# Patient Record
Sex: MALE | Race: WHITE | NOT HISPANIC OR LATINO | Employment: OTHER | URBAN - METROPOLITAN AREA
[De-identification: names, ages, dates, MRNs, and addresses within clinical notes are randomized per-mention and may not be internally consistent; named-entity substitution may affect disease eponyms.]

---

## 2017-01-03 ENCOUNTER — TELEPHONE (OUTPATIENT)
Dept: VASCULAR SURGERY | Facility: CLINIC | Age: 67
End: 2017-01-03

## 2017-01-03 ENCOUNTER — ANESTHESIA EVENT (OUTPATIENT)
Dept: SURGERY | Facility: HOSPITAL | Age: 67
DRG: 253 | End: 2017-01-03
Payer: MEDICARE

## 2017-01-04 ENCOUNTER — ANESTHESIA (OUTPATIENT)
Dept: SURGERY | Facility: HOSPITAL | Age: 67
DRG: 253 | End: 2017-01-04
Payer: MEDICARE

## 2017-01-04 PROBLEM — I73.9 PAD (PERIPHERAL ARTERY DISEASE): Status: ACTIVE | Noted: 2017-01-04

## 2017-01-04 PROCEDURE — 63600175 PHARM REV CODE 636 W HCPCS: Performed by: ANESTHESIOLOGY

## 2017-01-04 PROCEDURE — 25000003 PHARM REV CODE 250: Performed by: STUDENT IN AN ORGANIZED HEALTH CARE EDUCATION/TRAINING PROGRAM

## 2017-01-04 PROCEDURE — 27200677 HC TRANSDUCER MONITOR KIT SINGLE: Performed by: ANESTHESIOLOGY

## 2017-01-04 PROCEDURE — 36620 INSERTION CATHETER ARTERY: CPT | Mod: 59,,, | Performed by: ANESTHESIOLOGY

## 2017-01-04 PROCEDURE — 27100025 HC TUBING, SET FLUID WARMER: Performed by: ANESTHESIOLOGY

## 2017-01-04 PROCEDURE — D9220A PRA ANESTHESIA: Mod: ,,, | Performed by: ANESTHESIOLOGY

## 2017-01-04 PROCEDURE — 25000003 PHARM REV CODE 250: Performed by: ANESTHESIOLOGY

## 2017-01-04 PROCEDURE — 27100021 HC MULTIPORT INFUSION MANIFOLD: Performed by: ANESTHESIOLOGY

## 2017-01-04 RX ORDER — PROTAMINE SULFATE 10 MG/ML
INJECTION, SOLUTION INTRAVENOUS
Status: DISCONTINUED | OUTPATIENT
Start: 2017-01-04 | End: 2017-01-04

## 2017-01-04 RX ORDER — PROPOFOL 10 MG/ML
VIAL (ML) INTRAVENOUS
Status: DISCONTINUED | OUTPATIENT
Start: 2017-01-04 | End: 2017-01-04

## 2017-01-04 RX ORDER — GLYCOPYRROLATE 0.2 MG/ML
INJECTION INTRAMUSCULAR; INTRAVENOUS
Status: DISCONTINUED | OUTPATIENT
Start: 2017-01-04 | End: 2017-01-04

## 2017-01-04 RX ORDER — FENTANYL CITRATE 50 UG/ML
INJECTION, SOLUTION INTRAMUSCULAR; INTRAVENOUS
Status: DISCONTINUED | OUTPATIENT
Start: 2017-01-04 | End: 2017-01-04

## 2017-01-04 RX ORDER — SODIUM CHLORIDE 9 MG/ML
INJECTION, SOLUTION INTRAVENOUS CONTINUOUS PRN
Status: DISCONTINUED | OUTPATIENT
Start: 2017-01-04 | End: 2017-01-04

## 2017-01-04 RX ORDER — ONDANSETRON 2 MG/ML
INJECTION INTRAMUSCULAR; INTRAVENOUS
Status: DISCONTINUED | OUTPATIENT
Start: 2017-01-04 | End: 2017-01-04

## 2017-01-04 RX ORDER — LIDOCAINE HCL/PF 100 MG/5ML
SYRINGE (ML) INTRAVENOUS
Status: DISCONTINUED | OUTPATIENT
Start: 2017-01-04 | End: 2017-01-04

## 2017-01-04 RX ORDER — ROCURONIUM BROMIDE 10 MG/ML
INJECTION, SOLUTION INTRAVENOUS
Status: DISCONTINUED | OUTPATIENT
Start: 2017-01-04 | End: 2017-01-04

## 2017-01-04 RX ORDER — NEOSTIGMINE METHYLSULFATE 1 MG/ML
INJECTION, SOLUTION INTRAVENOUS
Status: DISCONTINUED | OUTPATIENT
Start: 2017-01-04 | End: 2017-01-04

## 2017-01-04 RX ORDER — LABETALOL HYDROCHLORIDE 5 MG/ML
INJECTION, SOLUTION INTRAVENOUS
Status: DISCONTINUED | OUTPATIENT
Start: 2017-01-04 | End: 2017-01-04

## 2017-01-04 RX ORDER — HEPARIN SODIUM 1000 [USP'U]/ML
INJECTION, SOLUTION INTRAVENOUS; SUBCUTANEOUS
Status: DISCONTINUED | OUTPATIENT
Start: 2017-01-04 | End: 2017-01-04

## 2017-01-04 RX ORDER — MIDAZOLAM HYDROCHLORIDE 1 MG/ML
INJECTION, SOLUTION INTRAMUSCULAR; INTRAVENOUS
Status: DISCONTINUED | OUTPATIENT
Start: 2017-01-04 | End: 2017-01-04

## 2017-01-04 RX ORDER — HYDROMORPHONE HYDROCHLORIDE 2 MG/ML
INJECTION, SOLUTION INTRAMUSCULAR; INTRAVENOUS; SUBCUTANEOUS
Status: DISCONTINUED | OUTPATIENT
Start: 2017-01-04 | End: 2017-01-04

## 2017-01-04 RX ADMIN — ONDANSETRON 4 MG: 2 INJECTION INTRAMUSCULAR; INTRAVENOUS at 11:01

## 2017-01-04 RX ADMIN — ROCURONIUM BROMIDE 30 MG: 10 INJECTION, SOLUTION INTRAVENOUS at 08:01

## 2017-01-04 RX ADMIN — PROPOFOL 180 MG: 10 INJECTION, EMULSION INTRAVENOUS at 08:01

## 2017-01-04 RX ADMIN — PROPOFOL 35 MG: 10 INJECTION, EMULSION INTRAVENOUS at 09:01

## 2017-01-04 RX ADMIN — HYDROMORPHONE HYDROCHLORIDE 0.6 MG: 2 INJECTION INTRAMUSCULAR; INTRAVENOUS; SUBCUTANEOUS at 11:01

## 2017-01-04 RX ADMIN — SODIUM CHLORIDE, SODIUM GLUCONATE, SODIUM ACETATE, POTASSIUM CHLORIDE, MAGNESIUM CHLORIDE, SODIUM PHOSPHATE, DIBASIC, AND POTASSIUM PHOSPHATE: .53; .5; .37; .037; .03; .012; .00082 INJECTION, SOLUTION INTRAVENOUS at 10:01

## 2017-01-04 RX ADMIN — SODIUM CHLORIDE: 0.9 INJECTION, SOLUTION INTRAVENOUS at 06:01

## 2017-01-04 RX ADMIN — PHENYLEPHRINE HYDROCHLORIDE 0.4 MCG/KG/MIN: 10 INJECTION INTRAVENOUS at 09:01

## 2017-01-04 RX ADMIN — FENTANYL CITRATE 150 MCG: 50 INJECTION, SOLUTION INTRAMUSCULAR; INTRAVENOUS at 09:01

## 2017-01-04 RX ADMIN — Medication 2 G: at 08:01

## 2017-01-04 RX ADMIN — SODIUM CHLORIDE, SODIUM GLUCONATE, SODIUM ACETATE, POTASSIUM CHLORIDE, MAGNESIUM CHLORIDE, SODIUM PHOSPHATE, DIBASIC, AND POTASSIUM PHOSPHATE: .53; .5; .37; .037; .03; .012; .00082 INJECTION, SOLUTION INTRAVENOUS at 08:01

## 2017-01-04 RX ADMIN — LIDOCAINE HYDROCHLORIDE 80 MG: 20 INJECTION, SOLUTION INTRAVENOUS at 08:01

## 2017-01-04 RX ADMIN — NEOSTIGMINE METHYLSULFATE 5 MG: 1 INJECTION INTRAVENOUS at 11:01

## 2017-01-04 RX ADMIN — FENTANYL CITRATE 100 MCG: 50 INJECTION, SOLUTION INTRAMUSCULAR; INTRAVENOUS at 08:01

## 2017-01-04 RX ADMIN — GLYCOPYRROLATE 0.6 MG: 0.2 INJECTION, SOLUTION INTRAMUSCULAR; INTRAVENOUS at 11:01

## 2017-01-04 RX ADMIN — ROCURONIUM BROMIDE 25 MG: 10 INJECTION, SOLUTION INTRAVENOUS at 09:01

## 2017-01-04 RX ADMIN — ROCURONIUM BROMIDE 15 MG: 10 INJECTION, SOLUTION INTRAVENOUS at 10:01

## 2017-01-04 RX ADMIN — SODIUM CHLORIDE: 0.9 INJECTION, SOLUTION INTRAVENOUS at 08:01

## 2017-01-04 RX ADMIN — HEPARIN SODIUM 7000 UNITS: 1000 INJECTION, SOLUTION INTRAVENOUS; SUBCUTANEOUS at 10:01

## 2017-01-04 RX ADMIN — REMIFENTANIL HYDROCHLORIDE 0.05 MCG/KG/MIN: 1 INJECTION, POWDER, LYOPHILIZED, FOR SOLUTION INTRAVENOUS at 08:01

## 2017-01-04 RX ADMIN — ROCURONIUM BROMIDE 50 MG: 10 INJECTION, SOLUTION INTRAVENOUS at 08:01

## 2017-01-04 RX ADMIN — MIDAZOLAM HYDROCHLORIDE 2 MG: 1 INJECTION, SOLUTION INTRAMUSCULAR; INTRAVENOUS at 08:01

## 2017-01-04 RX ADMIN — LABETALOL HYDROCHLORIDE 5 MG: 5 INJECTION, SOLUTION INTRAVENOUS at 11:01

## 2017-01-04 RX ADMIN — PROTAMINE SULFATE 50 MG: 10 INJECTION, SOLUTION INTRAVENOUS at 10:01

## 2017-01-04 NOTE — TRANSFER OF CARE
"Anesthesia Transfer of Care Note    Patient: Alek Joyner    Procedure(s) Performed: Procedure(s):  [id number]-FEMORAL    Patient location: PACU    Anesthesia Type: general    Transport from OR: Transported from OR on 6-10 L/min O2 by face mask with adequate spontaneous ventilation    Post pain: adequate analgesia    Post assessment: no apparent anesthetic complications    Post vital signs: stable    Level of consciousness: awake and responds to stimulation    Nausea/Vomiting: no nausea/vomiting    Complications: none          Last vitals:   Visit Vitals    BP (!) 157/70    Pulse 82    Temp 36.8 °C (98.3 °F) (Axillary)    Resp 19    Ht 5' 11" (1.803 m)    Wt 73.5 kg (162 lb)    SpO2 97%    BMI 22.59 kg/m2     "

## 2017-01-04 NOTE — ANESTHESIA PROCEDURE NOTES
Arterial    Diagnosis: PAD    Patient location during procedure: done in OR  Procedure start time: 1/4/2017 8:20 AM  Procedure end time: 1/4/2017 8:30 AM  Staffing  Anesthesiologist: FELIX LORD JR  Resident/CRNA: ERMELINDA GHOSH  Other anesthesia staff: SALINAS ASH  Performed by: resident/CRNA   Anesthesiologist was present at the time of the procedure.  Preanesthetic Checklist  Completed: patient identified, site marked, surgical consent, pre-op evaluation, timeout performed, IV checked, risks and benefits discussed, monitors and equipment checked and anesthesia consent given  Arterial Line  Skin Prep: chlorhexidine gluconate  Local Infiltration: none  Orientation: right  Location: radial  Catheter Size: 20 G{[facility] ANESTHESIA BLOCK ART PLACEMENTInsertion Attempts: 1  Assessment  Dressing: secured with tape and tegaderm  Patient: Tolerated well

## 2017-01-04 NOTE — ANESTHESIA RELEASE NOTE
"Anesthesia Release from PACU Note    Patient: Alek Joyner    Procedure(s) Performed: Procedure(s):  [id number]-FEMORAL    Final Anesthesia Type: general  Patient location during evaluation: PACU  Patient participation: Yes- Able to Participate  Level of consciousness: awake and alert  Post-procedure vital signs: reviewed and stable  Pain management: adequate  Airway patency: patent  PONV status at discharge: No PONV  Anesthetic complications: no      Cardiovascular status: blood pressure returned to baseline  Respiratory status: unassisted  Hydration status: euvolemic  Follow-up not needed.        Visit Vitals    BP (!) 142/56    Pulse 78    Temp 36.5 °C (97.7 °F) (Oral)    Resp 20    Ht 5' 11" (1.803 m)    Wt 73.5 kg (162 lb)    SpO2 95%    BMI 22.59 kg/m2       Pain/Stefani Score: Pain Assessment Performed: Yes (1/4/2017  1:15 PM)  Presence of Pain: denies (1/4/2017  1:15 PM)  Pain Rating Prior to Med Admin: 0 (1/4/2017 12:45 PM)  Stefani Score: 10 (1/4/2017  1:15 PM)  "

## 2017-01-04 NOTE — ANESTHESIA PREPROCEDURE EVALUATION
Past Medical History   Diagnosis Date    Hypertension      Past Surgical History   Procedure Laterality Date    Back surgery      Cardiac surgery      Cardiac bypass       Patient Active Problem List   Diagnosis    Failing vascular bypass graft    PAOD (peripheral arterial occlusive disease)    Atherosclerotic peripheral vascular disease with rest pain    HTN (hypertension)    Tobacco abuse    PAD (peripheral artery disease)     Neg stress test per Note above per Dr Lyles                                                                                                          01/04/2017  Alek Joyner is a 66 y.o., male.    [facility] Anesthesia Evaluation    I have reviewed the Patient Summary Reports.    I have reviewed the Nursing Notes.   I have reviewed the Medications.     Review of Systems  Anesthesia Hx:  No problems with previous Anesthesia    Social:  Smoker, No Alcohol Use    Hematology/Oncology:  Hematology Normal   Oncology Normal     EENT/Dental:EENT/Dental Normal   Cardiovascular:   Exercise tolerance: good Hypertension CABG/stent PVD ECG has been reviewed. Aorto bifem 2011  Rt ax fem fem 2016   Pulmonary:  Pulmonary Normal    Musculoskeletal:  Musculoskeletal Normal    Neurological:  Neurology Normal    Endocrine:  Endocrine Normal    Dermatological:  Skin Normal    Psych:  Psychiatric Normal           Physical Exam  General:  Well nourished    Airway/Jaw/Neck:  Airway Findings: Mouth Opening: Normal Tongue: Normal  Mallampati: II  TM Distance: Normal, at least 6 cm      Dental:  Dental Findings: Upper Dentures, Lower Dentures   Chest/Lungs:  Chest/Lungs Findings: Clear to auscultation, Normal Respiratory Rate     Heart/Vascular:  Heart Findings: Rate: Normal  Rhythm: Regular Rhythm        Mental Status:  Mental Status Findings:  Cooperative, Alert and Oriented         Anesthesia Plan  Type of Anesthesia, risks & benefits discussed:  Anesthesia Type:  general  Patient's Preference:  gen  Intra-op Monitoring Plan: arterial line  Intra-op Monitoring Plan Comments:   Post Op Pain Control Plan:   Post Op Pain Control Plan Comments: Iv>po  Induction:   IV  Beta Blocker:  Patient is on a Beta-Blocker and has received one dose within the past 24 hours (No further documentation required).       Informed Consent: Patient understands risks and agrees with Anesthesia plan.  Questions answered. Anesthesia consent signed with patient.  ASA Score: 3     Day of Surgery Review of History & Physical:    H&P update referred to the surgeon.         Ready For Surgery From Anesthesia Perspective.

## 2017-01-04 NOTE — ANESTHESIA POSTPROCEDURE EVALUATION
"Anesthesia Post Evaluation    Patient: Alek Joyner    Procedure(s) Performed: Procedure(s):  [id number]-FEMORAL    Final Anesthesia Type: general  Patient location during evaluation: PACU  Patient participation: Yes- Able to Participate  Level of consciousness: awake and alert  Post-procedure vital signs: reviewed and stable  Pain management: adequate  Airway patency: patent  PONV status at discharge: No PONV  Anesthetic complications: no      Cardiovascular status: blood pressure returned to baseline  Respiratory status: unassisted  Hydration status: euvolemic  Follow-up not needed.        Visit Vitals    BP (!) 142/56    Pulse 78    Temp 36.5 °C (97.7 °F) (Oral)    Resp 20    Ht 5' 11" (1.803 m)    Wt 73.5 kg (162 lb)    SpO2 95%    BMI 22.59 kg/m2       Pain/Stefani Score: Pain Assessment Performed: Yes (1/4/2017  1:15 PM)  Presence of Pain: denies (1/4/2017  1:15 PM)  Pain Rating Prior to Med Admin: 0 (1/4/2017 12:45 PM)  Stefani Score: 10 (1/4/2017  1:15 PM)      "

## 2017-01-06 ENCOUNTER — TELEPHONE (OUTPATIENT)
Dept: VASCULAR SURGERY | Facility: CLINIC | Age: 67
End: 2017-01-06

## 2017-01-06 DIAGNOSIS — I73.9 PAD (PERIPHERAL ARTERY DISEASE): Primary | ICD-10-CM

## 2017-01-06 NOTE — TELEPHONE ENCOUNTER
Pt wife Cristina wanted to know was the xarelto ready for pickup at the pharmacy. I told the pt wife that upon discharge if Dr. Roldan keep the pt on this medication, instruction will given by the nurse on the floor.

## 2017-01-06 NOTE — TELEPHONE ENCOUNTER
----- Message from Dom Rodriguez sent at 1/6/2017 10:28 AM CST -----  Contact: Corey//Pharmacy  Caller states that she needs to speak with nurse in ref to if the pt was supposed to have Xarelto medication sent down to pharmacy//please call back at [phone]//thank you

## 2017-01-09 ENCOUNTER — PATIENT OUTREACH (OUTPATIENT)
Dept: ADMINISTRATIVE | Facility: CLINIC | Age: 67
End: 2017-01-09
Payer: MEDICARE

## 2017-01-09 NOTE — PATIENT INSTRUCTIONS
Peripheral Artery Disease (PAD)    Peripheral artery disease (PAD) occurs when the arteries that carry blood to the limbs are narrowed or blocked. This is usually due to a buildup of a fatty substance called plaque in the walls of the arteries.  PAD most often affects the arteries in the legs. When these arteries are narrowed or blocked, blood flow to the legs is reduced. This can cause leg and foot pain and other symptoms. If severe enough, reduced blood flow to the legs can lead to tissue death (gangrene) and the loss of a toe, foot, or leg. Having PAD also makes it more likely that arteries in other body areas are blocked. For instance, arteries that carry blood to the heart or brain may be affected. This raises the chances of heart attack and stroke.  Risk factors  Certain factors can make PAD more likely. They include:  · Smoking  · Diabetes  · High blood pressure  · Unhealthy cholesterol levels  · Obesity  · Inactive lifestyle  · Older age  · Family history of PAD  Symptoms  Many people with PAD have no symptoms. If symptoms do occur, they can include:  · Pain in the muscles of the calves, thighs or hips that gets worse with activity and better with rest (intermittent claudication)  · Achy, tired, or heavy feeling in the legs  · Weakness, numbness, tingling, or loss of feeling in the legs  · Changes in skin color of the legs  · Sores on the legs and feet  · Cold leg, feet, or toes  · Pain the feet or toes even when lying down (rest pain)  Home care  PAD is a chronic (lifelong) condition. Treatment is focused on managing your condition and lowering your health risks. This may include doing the following:  · If you smoke, quit. This helps prevent further damage to your arteries and lowers your health risks. Ask your provider about medicines or products that can help you quit smoking. Also consider joining a stop-smoking program or support group.  · Be more active. This helps you lose weight and manage problems  such as high blood pressure and unhealthy cholesterol levels. Start a walking program if advised to by your provider. Your provider may also help you form a safe exercise program that is right for your needs.  · Make healthy eating changes. This includes eating less fat, salt, and sugar.  · Take medicines for high blood pressure, unhealthy cholesterol levels, and diabetes as directed.  · Have your blood pressure and cholesterol levels checked as often as directed.  · If you have diabetes, try to keep your blood sugar well controlled. Test your blood sugar as directed.  · If you are overweight, talk to your provider about forming a weight-loss plan.  · Watch for cuts, scrapes, or open sores on your feet. Poor blood flow to the feet may slow healing and increase the risk of infection from these problems.   Follow-up care  Follow up with your healthcare provider, or as advised. If imaging tests such as ultrasound were done, they will be reviewed by a doctor. You will be told the results and any new findings that may affect your care.  When to seek medical advice   Call your healthcare provider right away if any of these occur:  · Sudden severe pain in the legs or feet  · Sudden cold, pale or blue color in the legs or feet  · Weakness or numbness in the legs or feet that worsens  · Any sore or wound in the legs or feet that wont heal  · Weak pulse in your legs or feet  Know the Signs of Heart Attack and Stroke  People with PAD are at high risk for heart attack and stroke. Knowing the signs of these problems can help you protect your health and get help when you need it. Call 911 right away if you have any of the following:  Signs of a Heart Attack  · Chest discomfort, such as pain, aching, tightness, or pressure that lasts more than a few minutes, or that comes and goes  · Pain or discomfort in the arms, back, shoulders, neck, or jaw  · Shortness of breath  · Sweating (often a cold, clammy  sweat)  · Nausea  · Lightheadedness  Signs of a Stroke  · Sudden numbness or weakness of the face, arms, or legs, especially on one side  · Sudden confusion or trouble speaking or understanding  · Sudden trouble seeing in one or both eyes  · Sudden trouble walking, dizziness, or loss of balance  · Sudden, severe headache with no known cause   © 2651-1500 [employer]. [address] [place], [place] [address]. All rights reserved. This information is not intended as a substitute for professional medical care. Always follow your healthcare professional's instructions.

## 2017-01-24 ENCOUNTER — HOSPITAL ENCOUNTER (OUTPATIENT)
Dept: VASCULAR SURGERY | Facility: CLINIC | Age: 67
Discharge: HOME | End: 2017-01-24
Payer: MEDICARE

## 2017-01-24 ENCOUNTER — OFFICE VISIT (OUTPATIENT)
Dept: VASCULAR SURGERY | Facility: CLINIC | Age: 67
End: 2017-01-24
Payer: MEDICARE

## 2017-01-24 VITALS
SYSTOLIC BLOOD PRESSURE: 110 MMHG | WEIGHT: 166 LBS | HEART RATE: 86 BPM | BODY MASS INDEX: 23.24 KG/M2 | TEMPERATURE: 98 F | HEIGHT: 71 IN | DIASTOLIC BLOOD PRESSURE: 72 MMHG

## 2017-01-24 DIAGNOSIS — T82.599D: ICD-10-CM

## 2017-01-24 DIAGNOSIS — I70.229 ATHEROSCLEROTIC PERIPHERAL VASCULAR DISEASE WITH REST PAIN: Primary | ICD-10-CM

## 2017-01-24 DIAGNOSIS — I73.9 PAD (PERIPHERAL ARTERY DISEASE): ICD-10-CM

## 2017-01-24 DIAGNOSIS — I73.9 PERIPHERAL VASCULAR DISEASE: ICD-10-CM

## 2017-01-24 PROCEDURE — 99999 PR PBB SHADOW E&M-EST. PATIENT-LVL III: CPT | Mod: PBBFAC,,, | Performed by: SURGERY

## 2017-01-24 PROCEDURE — 93923 UPR/LXTR ART STDY 3+ LVLS: CPT | Mod: 26,S$PBB,, | Performed by: SURGERY

## 2017-01-24 PROCEDURE — 93925 LOWER EXTREMITY STUDY: CPT | Mod: 26,S$PBB,, | Performed by: SURGERY

## 2017-01-24 PROCEDURE — 99213 OFFICE O/P EST LOW 20 MIN: CPT | Mod: PBBFAC | Performed by: SURGERY

## 2017-01-24 PROCEDURE — 99024 POSTOP FOLLOW-UP VISIT: CPT | Mod: ,,, | Performed by: SURGERY

## 2017-01-24 NOTE — MR AVS SNAPSHOT
[facility] - Vascular Surgery  [address]  [facility] [address]  Phone: [phone]  Fax: [phone]                  Alek Joyner   2017 9:30 AM   Office Visit    Description:  Male : 1950   Provider:  KWASI Roldan III, MD   Department:  [facility] - Vascular Surgery           Reason for Visit     Post-op Evaluation           Diagnoses this Visit        Comments    Atherosclerotic peripheral vascular disease with rest pain    -  Primary            To Do List           Future Appointments        Provider Department Dept Phone    2017 9:30 AM KWASI Roldan III, MD [facility] Vascular Surgery [phone]      Goals (5 Years of Data)     None      Follow-Up and Disposition     Return in about 1 year (around 2018).      [facility]     [facility] -  Assistance  Registered nurses in the [facility] provide clinical advisement, health education, appointment booking, and other advisory services.  Call for this free service at [phone].             Medications           Message regarding Medications     Verify the changes and/or additions to your medication regime listed below are the same as discussed with your clinician today.  If any of these changes or additions are incorrect, please notify your healthcare provider.             Verify that the below list of medications is an accurate representation of the medications you are currently taking.  If none reported, the list may be blank. If incorrect, please contact your healthcare provider. Carry this list with you in case of emergency.           Current Medications     aspirin (ECOTRIN) 81 MG EC tablet Take 81 mg by mouth every morning.    atorvastatin (LIPITOR) 40 MG tablet Take 1 tablet (40 mg total) by mouth once daily.    hydrocodone-acetaminophen 5-325mg (NORCO) 5-325 mg per tablet Take 1 tablet by mouth every 4 (four) hours as needed.    lisinopril-hydrochlorothiazide  "(PRINZIDE,ZESTORETIC) 10-12.5 mg per tablet Take 1 tablet by mouth every morning.    ondansetron (ZOFRAN-ODT) 8 MG TbDL Take 1 tablet (8 mg total) by mouth every 6 (six) hours as needed (nausea).    rivaroxaban (XARELTO) 20 mg Tab Take 1 tablet (20 mg total) by mouth once daily.           Clinical Reference Information           Vital Signs - Last Recorded  Most recent update: 1/24/2017  8:26 AM by Jenise Cruz MA    BP Pulse Temp Ht Wt BMI    110/72 (BP Location: Right arm, Patient Position: Sitting, BP Method: Automatic) 86 97.6 °F (36.4 °C) (Oral) 5' 11" (1.803 m) 75.3 kg (166 lb) 23.15 kg/m2      Blood Pressure          Most Recent Value    Right Arm BP - Sitting  110/72    Left Arm BP - Sitting  109/57    BP  110/72      Allergies as of 1/24/2017     No Known Allergies      Immunizations Administered on Date of Encounter - 1/24/2017     None      Orders Placed During Today's Visit     Future Labs/Procedures Expected by Expires    VAS Ankle Brachial Indices Resting  As directed 1/24/2019    VAS US Arterial Legs Bilateral  As directed 1/24/2019      [facility] Sign-Up     Activating your [facility] account is as easy as 1-2-3!     1) Visit [email or web address], select Sign Up Now, enter this activation code and your date of birth, then select Next.  [email or web address]  Expires: 2/3/2017  2:03 PM      2) Create a username and password to use when you visit [facility] in the future and select a security question in case you lose your password and select Next.    3) Enter your e-mail address and click Sign Up!    Additional Information  If you have questions, please e-mail [email or web address] or call [phone] to talk to our [facility] staff. Remember, [facility] is NOT to be used for urgent needs. For medical emergencies, dial 911.         Smoking Cessation     If you would like to quit smoking:   You may be eligible for free services if you are a [place] resident and started smoking cigarettes before September 1, " 1988.  Call the Smoking Cessation Trust (SCT) toll free at (443) 203-6322 or (119) 514-2325.   Call 6-144-QUIT-NOW if you do not meet the above criteria.

## 2017-01-24 NOTE — PROGRESS NOTES
See my prior note; review of systems, family history and social history are   unchanged.    INITIAL REFERRING PHYSICIAN:  Dr. Jan Pate.    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  Left ax-fem-fem bypass on 01/04/2017.  2.  Right ax-fem-fem bypass on 10/19/2016 (Dr. Pate).  3.  Aortobifemoral bypass in 2011 (? Dr. Pate).    He now returns.  He was having very, very short distance claudication and   ischemic rest pain of the left first toe.  Ischemic rest pain is gone and his   claudication is substantially improved.  He is still smoking, although says he   has cut down somewhat.    MEDICATIONS:  Include that Xarelto and aspirin 81 mg daily and a statin.    PHYSICAL EXAMINATION:  VITAL SIGNS:  See nursing note.  EXTREMITIES:  His left infraclavicular incision and groin incisions are all well   healed.  Pedal pulses are not palpable.  EXTREMITIES:  On the left, there is a reasonably good PT signal and on the right   PT signal is present, but somewhat weaker and also with a present but fairly   weak DP signal.  Feet are pink and well perfused without lesions.    IMAGING:  ABIs are 0.95 on the right and 0.94 on the left, compared with 0.38   bilaterally preoperatively.    Duplex of the bypass shows it to be patent with no stenosis.    ASSESSMENT:  1.  Patent left ax-fem fem-fem bypass.  2.  Of note, the distal limbs were anastomosed end-to-side to the very proximal   SFAs on both sides because of severe scarring from previous surgeries.  3.  Continued smoking.    I have told them that it is essential that he stay anticoagulated.  There was   some question about whether he was going to able to afford the Xarelto, but has   gotten a years' worth prescription for it.  Because of long distance involved,   he does not want to follow up on a regular basis, but would like to come back in   a year.    I have stressed the importance that he has an abrupt change in  his symptoms that   he needs come and see us immediately as if this were graft which were to   occlude, we could likely open it if seen acutely, but not if he waits for 6   weeks.  He understands.  Finally, I have underscored the importance of continued   cutting down the cigarettes and complete smoking cessation.    PLAN:  Follow up in one year with duplex of the bypass and ABIs, sooner if   clinically indicated.      ELIDA/DONNIE  dd: 01/24/2017 09:15:27 (CST)  td: 01/25/2017 04:48:19 (CST)  Doc ID   #[id number]  Job ID #[id number]    CC: Jan Pate M.D.

## 2017-03-06 ENCOUNTER — TELEPHONE (OUTPATIENT)
Dept: VASCULAR SURGERY | Facility: CLINIC | Age: 67
End: 2017-03-06

## 2017-03-06 NOTE — TELEPHONE ENCOUNTER
Spoke with someone at Dr. Michell Mcfarlane office @ 2:02pm , Dr. Roldan approve coumadin 5mg daily

## 2017-03-06 NOTE — TELEPHONE ENCOUNTER
----- Message from KWASI Roldan III, MD sent at 3/6/2017  1:19 PM CST -----  Coumadin 5 mg daily.   He will have to find a PCP locally that can monitor and adjust his dose  ----- Message -----     From: Meenakshi Mayer MA     Sent: 3/6/2017  10:13 AM       To: KWASI Roldan III, MD    Good morning, Pt is no longer able to afford Xarelto, He is requesting to be on something  similar that he can afford, The pt has no insurance . I updated his pharmacy in the computer so we can e-scribe new rx  If needed.    Please advise

## 2018-01-12 ENCOUNTER — HOSPITAL ENCOUNTER (OUTPATIENT)
Dept: VASCULAR SURGERY | Facility: CLINIC | Age: 68
Discharge: HOME | End: 2018-01-12
Attending: SURGERY
Payer: MEDICARE

## 2018-01-12 ENCOUNTER — OFFICE VISIT (OUTPATIENT)
Dept: VASCULAR SURGERY | Facility: CLINIC | Age: 68
End: 2018-01-12
Payer: MEDICARE

## 2018-01-12 VITALS
DIASTOLIC BLOOD PRESSURE: 68 MMHG | HEART RATE: 90 BPM | WEIGHT: 173 LBS | SYSTOLIC BLOOD PRESSURE: 111 MMHG | TEMPERATURE: 99 F | BODY MASS INDEX: 26.22 KG/M2 | HEIGHT: 68 IN

## 2018-01-12 DIAGNOSIS — I70.229 ATHEROSCLEROTIC PERIPHERAL VASCULAR DISEASE WITH REST PAIN: ICD-10-CM

## 2018-01-12 DIAGNOSIS — I73.9 PERIPHERAL VASCULAR DISEASE, UNSPECIFIED: ICD-10-CM

## 2018-01-12 DIAGNOSIS — I70.229 ATHEROSCLEROTIC PERIPHERAL VASCULAR DISEASE WITH REST PAIN: Primary | ICD-10-CM

## 2018-01-12 PROCEDURE — 99999 PR PBB SHADOW E&M-EST. PATIENT-LVL III: CPT | Mod: PBBFAC,,, | Performed by: SURGERY

## 2018-01-12 PROCEDURE — 99213 OFFICE O/P EST LOW 20 MIN: CPT | Mod: PBBFAC,25 | Performed by: SURGERY

## 2018-01-12 PROCEDURE — 93925 LOWER EXTREMITY STUDY: CPT | Mod: PBBFAC | Performed by: SURGERY

## 2018-01-12 PROCEDURE — 93923 UPR/LXTR ART STDY 3+ LVLS: CPT | Mod: PBBFAC | Performed by: SURGERY

## 2018-01-12 PROCEDURE — 93925 LOWER EXTREMITY STUDY: CPT | Mod: 26,S$PBB,, | Performed by: SURGERY

## 2018-01-12 PROCEDURE — 93923 UPR/LXTR ART STDY 3+ LVLS: CPT | Mod: 26,S$PBB,, | Performed by: SURGERY

## 2018-01-12 PROCEDURE — 99213 OFFICE O/P EST LOW 20 MIN: CPT | Mod: S$PBB,,, | Performed by: SURGERY

## 2018-01-12 NOTE — PROGRESS NOTES
See my prior note; review of systems, family history and social history are   unchanged.    INITIAL REFERRING PHYSICIAN:  Dr. Jan Pate.    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  Left ax-fem-fem bypass on 01/04/2017.(Axillary graft tunneled ant to pec major)  2.  Right ax-fem-fem bypass on 10/19/2016 (Dr. Pate).  3.  Aortobifemoral bypass in 2011 (? Dr. Pate).    He now returns.  He was having very, very short distance claudication and   ischemic rest pain of the left first toe.  Ischemic rest pain is gone and his   claudication is substantially improved.  He is still smoking, although says he   has cut down somewhat.    This is a year f/u.  Cont to have no claudication.  Is having persistent L thigh numbness    MEDICATIONS:  Coumadin now (instead of xeralto b/c of cost) and aspirin 81 mg daily and a statin.    PHYSICAL EXAMINATION:  VITAL SIGNS:  See nursing note.  EXTREMITIES:  His left infraclavicular incision and groin incisions are all well   healed.  Pedal pulses are not palpable.  EXTREMITIES:  On the left, there is a reasonably good PT signal and on the right   PT signal is present, but somewhat weaker and also with a present but fairly   weak DP signal.  Feet are pink and well perfused without lesions.    IMAGING:  ABIs are 0.96 (prior 0.95 on the right and 0.93 (prior 0.94 on the left, compared with 0.38   bilaterally preoperatively.    Duplex of the bypass shows it to be patent with no stenosis.    ASSESSMENT:  1.  Patent left ax-fem fem-fem bypass, 1 yr with primary patency  2.  Of note, the distal limbs were anastomosed end-to-side to the very proximal   SFAs on both sides because of severe scarring from previous surgeries.  3.  Continued smoking.    I have told them that it is essential that he stay anticoagulated.     I have stressed the importance that he has an abrupt change in his symptoms that   he needs come and see us immediately  as if this were graft which were to   occlude, we could likely open it if seen acutely, but not if he waits for 6   weeks.  He understands.      Finally, I have underscored the importance of continued   cutting down the cigarettes and complete smoking cessation.    PLAN:  Follow up in one year with duplex of the bypass and ABIs, sooner if   clinically indicated.      CC: Jan Pate M.D.

## 2018-01-12 NOTE — LETTER
January 12, 2018      Michell Mcfarlane NP  [address] [facility] [address]           [facility] - Vascular Surgery  [address]  [place] [address]  Phone: [phone]  Fax: [phone]          Patient: Alek Joyner   MR Number: [id number]   YOB: 1950   Date of Visit: 1/12/2018       Dear Michell Mcfarlane:    Thank you for referring Alek Joyner to me for evaluation. Attached you will find relevant portions of my assessment and plan of care.    If you have questions, please do not hesitate to call me. I look forward to following Alek Joyner along with you.    Sincerely,    KWASI Roldan III, MD    Enclosure  CC:  No Recipients    If you would like to receive this communication electronically, please contact [email or web address] or [phone] to request more information on [employer] Link access.    For providers and/or their staff who would like to refer a patient to [facility], please contact us through our one-stop-shop provider referral line, [facility], at [phone].    If you feel you have received this communication in error or would no longer like to receive these types of communications, please e-mail [email or web address]

## 2018-07-11 ENCOUNTER — TELEPHONE (OUTPATIENT)
Dept: VASCULAR SURGERY | Facility: CLINIC | Age: 68
End: 2018-07-11

## 2018-07-11 DIAGNOSIS — I73.9 PERIPHERAL ARTERIAL DISEASE: ICD-10-CM

## 2018-07-11 DIAGNOSIS — I99.8 LIMB ISCHEMIA: Primary | ICD-10-CM

## 2018-07-11 DIAGNOSIS — I99.8 LOWER LIMB ISCHEMIA: ICD-10-CM

## 2018-07-11 RX ORDER — SODIUM CHLORIDE 9 MG/ML
INJECTION, SOLUTION INTRAVENOUS CONTINUOUS
Status: CANCELLED | OUTPATIENT
Start: 2018-07-11

## 2018-07-11 RX ORDER — HYDROMORPHONE HYDROCHLORIDE 1 MG/ML
1 INJECTION, SOLUTION INTRAMUSCULAR; INTRAVENOUS; SUBCUTANEOUS EVERY 4 HOURS PRN
Status: CANCELLED | OUTPATIENT
Start: 2018-07-11

## 2018-07-11 RX ORDER — SODIUM CHLORIDE 0.9 % (FLUSH) 0.9 %
3 SYRINGE (ML) INJECTION
Status: CANCELLED | OUTPATIENT
Start: 2018-07-11

## 2018-07-11 RX ORDER — HYDROCODONE BITARTRATE AND ACETAMINOPHEN 5; 325 MG/1; MG/1
1 TABLET ORAL EVERY 4 HOURS PRN
Status: CANCELLED | OUTPATIENT
Start: 2018-07-11

## 2018-07-11 NOTE — TELEPHONE ENCOUNTER
Contacted patient regarding message received from Dr. Pate's office stating he needed a clinic appt with Dr. Roldan. Pt states he is having severe pain in elissa. LE. Appt scheduled with Dr. Roldan for Friday 7/13/18. Patient verified appt time. In basket message sent to Dr. Roldan regarding patient's clinic visit. Contacted Dr. Pate's nurse Carole to notify her appt time with Dr. Roldan.

## 2018-07-11 NOTE — TELEPHONE ENCOUNTER
Notified patient that Dr. Roldan would like to have him admitted tomorrow 7/12/18 to the hospital and would like to operate Friday 7/13/18. Notified patient that he must stop taking Xarelto now and he must bring the disc with images of recent CTA of abdominal aorta w/ runoff. Patient verbalizes understanding and states he can be at hospital by mid-morning. Instructed patient to report to admitting office when he arrives. Patient verbalizes understanding.

## 2018-07-12 ENCOUNTER — HOSPITAL ENCOUNTER (INPATIENT)
Facility: HOSPITAL | Age: 68
LOS: 3 days | Discharge: HOME | DRG: 253 | End: 2018-07-15
Attending: SURGERY | Admitting: SURGERY
Payer: MEDICARE

## 2018-07-12 ENCOUNTER — ANESTHESIA EVENT (OUTPATIENT)
Dept: SURGERY | Facility: HOSPITAL | Age: 68
DRG: 253 | End: 2018-07-12
Payer: MEDICARE

## 2018-07-12 ENCOUNTER — TELEPHONE (OUTPATIENT)
Dept: VASCULAR SURGERY | Facility: CLINIC | Age: 68
End: 2018-07-12

## 2018-07-12 DIAGNOSIS — T82.868S: Primary | ICD-10-CM

## 2018-07-12 DIAGNOSIS — I73.9 PERIPHERAL ARTERIAL DISEASE: ICD-10-CM

## 2018-07-12 LAB
ABO + RH BLD: NORMAL
ALBUMIN SERPL BCP-MCNC: 3.4 G/DL
ALP SERPL-CCNC: 92 U/L
ALT SERPL W/O P-5'-P-CCNC: 20 U/L
ANION GAP SERPL CALC-SCNC: 7 MMOL/L
APTT BLDCRRT: 23.8 SEC
AST SERPL-CCNC: 23 U/L
BASOPHILS # BLD AUTO: 0.05 K/UL
BASOPHILS NFR BLD: 0.6 %
BILIRUB SERPL-MCNC: 0.2 MG/DL
BLD GP AB SCN CELLS X3 SERPL QL: NORMAL
BUN SERPL-MCNC: 20 MG/DL
CALCIUM SERPL-MCNC: 9 MG/DL
CHLORIDE SERPL-SCNC: 106 MMOL/L
CK SERPL-CCNC: 100 U/L
CO2 SERPL-SCNC: 24 MMOL/L
CREAT SERPL-MCNC: 1.2 MG/DL
DIFFERENTIAL METHOD: ABNORMAL
EOSINOPHIL # BLD AUTO: 0.2 K/UL
EOSINOPHIL NFR BLD: 2.8 %
ERYTHROCYTE [DISTWIDTH] IN BLOOD BY AUTOMATED COUNT: 12.8 %
EST. GFR  (AFRICAN AMERICAN): >60 ML/MIN/1.73 M^2
EST. GFR  (NON AFRICAN AMERICAN): >60 ML/MIN/1.73 M^2
FACT X PPP CHRO-ACNC: 0.28 IU/ML
GLUCOSE SERPL-MCNC: 98 MG/DL
HCT VFR BLD AUTO: 31.8 %
HGB BLD-MCNC: 10.6 G/DL
IMM GRANULOCYTES # BLD AUTO: 0.03 K/UL
IMM GRANULOCYTES NFR BLD AUTO: 0.4 %
INR PPP: 1
LYMPHOCYTES # BLD AUTO: 2.3 K/UL
LYMPHOCYTES NFR BLD: 28.2 %
MAGNESIUM SERPL-MCNC: 2.1 MG/DL
MCH RBC QN AUTO: 31 PG
MCHC RBC AUTO-ENTMCNC: 33.3 G/DL
MCV RBC AUTO: 93 FL
MONOCYTES # BLD AUTO: 1 K/UL
MONOCYTES NFR BLD: 11.7 %
NEUTROPHILS # BLD AUTO: 4.7 K/UL
NEUTROPHILS NFR BLD: 56.3 %
NRBC BLD-RTO: 0 /100 WBC
PHOSPHATE SERPL-MCNC: 3.8 MG/DL
PLATELET # BLD AUTO: 321 K/UL
PMV BLD AUTO: 9.1 FL
POTASSIUM SERPL-SCNC: 4.1 MMOL/L
PROT SERPL-MCNC: 6.2 G/DL
PROTHROMBIN TIME: 10.2 SEC
RBC # BLD AUTO: 3.42 M/UL
SODIUM SERPL-SCNC: 137 MMOL/L
WBC # BLD AUTO: 8.31 K/UL

## 2018-07-12 PROCEDURE — 36415 COLL VENOUS BLD VENIPUNCTURE: CPT

## 2018-07-12 PROCEDURE — 86850 RBC ANTIBODY SCREEN: CPT

## 2018-07-12 PROCEDURE — 20600001 HC STEP DOWN PRIVATE ROOM

## 2018-07-12 PROCEDURE — 84100 ASSAY OF PHOSPHORUS: CPT

## 2018-07-12 PROCEDURE — 25000003 PHARM REV CODE 250: Performed by: SURGERY

## 2018-07-12 PROCEDURE — 82550 ASSAY OF CK (CPK): CPT

## 2018-07-12 PROCEDURE — 85730 THROMBOPLASTIN TIME PARTIAL: CPT

## 2018-07-12 PROCEDURE — 80053 COMPREHEN METABOLIC PANEL: CPT

## 2018-07-12 PROCEDURE — 99223 1ST HOSP IP/OBS HIGH 75: CPT | Mod: 57,AI,, | Performed by: SURGERY

## 2018-07-12 PROCEDURE — 93922 UPR/L XTREMITY ART 2 LEVELS: CPT | Performed by: SURGERY

## 2018-07-12 PROCEDURE — 85025 COMPLETE CBC W/AUTO DIFF WBC: CPT

## 2018-07-12 PROCEDURE — 93880 EXTRACRANIAL BILAT STUDY: CPT

## 2018-07-12 PROCEDURE — 86920 COMPATIBILITY TEST SPIN: CPT

## 2018-07-12 PROCEDURE — 83735 ASSAY OF MAGNESIUM: CPT

## 2018-07-12 PROCEDURE — 85520 HEPARIN ASSAY: CPT

## 2018-07-12 PROCEDURE — 85610 PROTHROMBIN TIME: CPT

## 2018-07-12 RX ORDER — ONDANSETRON 8 MG/1
8 TABLET, ORALLY DISINTEGRATING ORAL EVERY 6 HOURS PRN
Status: DISCONTINUED | OUTPATIENT
Start: 2018-07-12 | End: 2018-07-15 | Stop reason: HOSPADM

## 2018-07-12 RX ORDER — SODIUM CHLORIDE 9 MG/ML
INJECTION, SOLUTION INTRAVENOUS CONTINUOUS
Status: DISCONTINUED | OUTPATIENT
Start: 2018-07-12 | End: 2018-07-13

## 2018-07-12 RX ORDER — ASPIRIN 81 MG/1
81 TABLET ORAL EVERY MORNING
Status: DISCONTINUED | OUTPATIENT
Start: 2018-07-12 | End: 2018-07-15 | Stop reason: HOSPADM

## 2018-07-12 RX ORDER — HYDROCODONE BITARTRATE AND ACETAMINOPHEN 5; 325 MG/1; MG/1
1 TABLET ORAL EVERY 4 HOURS PRN
Status: DISCONTINUED | OUTPATIENT
Start: 2018-07-12 | End: 2018-07-15 | Stop reason: HOSPADM

## 2018-07-12 RX ORDER — HEPARIN SODIUM,PORCINE/D5W 25000/250
17 INTRAVENOUS SOLUTION INTRAVENOUS CONTINUOUS
Status: DISCONTINUED | OUTPATIENT
Start: 2018-07-12 | End: 2018-07-12

## 2018-07-12 RX ORDER — SODIUM CHLORIDE 0.9 % (FLUSH) 0.9 %
3 SYRINGE (ML) INJECTION
Status: DISCONTINUED | OUTPATIENT
Start: 2018-07-12 | End: 2018-07-15 | Stop reason: HOSPADM

## 2018-07-12 RX ORDER — ATORVASTATIN CALCIUM 20 MG/1
40 TABLET, FILM COATED ORAL DAILY
Status: DISCONTINUED | OUTPATIENT
Start: 2018-07-12 | End: 2018-07-15 | Stop reason: HOSPADM

## 2018-07-12 RX ORDER — LISINOPRIL AND HYDROCHLOROTHIAZIDE 10; 12.5 MG/1; MG/1
1 TABLET ORAL EVERY MORNING
Status: DISCONTINUED | OUTPATIENT
Start: 2018-07-12 | End: 2018-07-14

## 2018-07-12 RX ADMIN — SODIUM CHLORIDE: 0.9 INJECTION, SOLUTION INTRAVENOUS at 02:07

## 2018-07-12 RX ADMIN — LISINOPRIL AND HYDROCHLOROTHIAZIDE 1 TABLET: 12.5; 1 TABLET ORAL at 02:07

## 2018-07-12 RX ADMIN — ASPIRIN 81 MG: 81 TABLET, COATED ORAL at 02:07

## 2018-07-12 RX ADMIN — ATORVASTATIN CALCIUM 40 MG: 20 TABLET, FILM COATED ORAL at 02:07

## 2018-07-12 NOTE — SUBJECTIVE & OBJECTIVE
Prescriptions Prior to Admission   Medication Sig Dispense Refill Last Dose    aspirin (ECOTRIN) 81 MG EC tablet Take 81 mg by mouth every morning.   Taking    atorvastatin (LIPITOR) 40 MG tablet Take 1 tablet (40 mg total) by mouth once daily. 90 tablet 3 Taking    hydrocodone-acetaminophen 5-325mg (NORCO) 5-325 mg per tablet Take 1 tablet by mouth every 4 (four) hours as needed. 40 tablet 0 Taking    lisinopril-hydrochlorothiazide (PRINZIDE,ZESTORETIC) 10-12.5 mg per tablet Take 1 tablet by mouth every morning. 90 tablet 3 Taking    ondansetron (ZOFRAN-ODT) 8 MG TbDL Take 1 tablet (8 mg total) by mouth every 6 (six) hours as needed (nausea). 30 tablet 2 Taking    rivaroxaban (XARELTO) 20 mg Tab Take 1 tablet (20 mg total) by mouth once daily. 30 tablet 3 Taking       Review of patient's allergies indicates:  No Known Allergies    Past Medical History:   Diagnosis Date    Hypertension      Past Surgical History:   Procedure Laterality Date    BACK SURGERY      cardiac bypass      CARDIAC SURGERY       Family History     None        Social History Main Topics    Smoking status: Current Some Day Smoker     Types: Cigarettes    Smokeless tobacco: Not on file    Alcohol use No    Drug use: No    Sexual activity: Not on file     Review of Systems   Constitutional: Positive for activity change. Negative for chills.   HENT: Negative for congestion and dental problem.    Eyes: Negative for discharge.   Respiratory: Negative for apnea.    Cardiovascular: Negative for chest pain.   Gastrointestinal: Negative for abdominal distention.   Musculoskeletal: Positive for myalgias.   Skin: Negative for color change and wound.   Hematological: Negative for adenopathy. Bruises/bleeds easily.   Psychiatric/Behavioral: Negative for agitation and behavioral problems.     Objective:     Vital Signs (Most Recent):  Temp: 97.7 °F (36.5 °C) (07/12/18 1114)  Pulse: 82 (07/12/18 1114)  Resp: 17 (07/12/18 1114)  BP: (!) 106/55  (07/12/18 1114)  SpO2: 96 % (07/12/18 1114) Vital Signs (24h Range):  Temp:  [97.7 °F (36.5 °C)] 97.7 °F (36.5 °C)  Pulse:  [82] 82  Resp:  [17] 17  SpO2:  [96 %] 96 %  BP: (106)/(55) 106/55        There is no height or weight on file to calculate BMI.    Physical Exam   Constitutional: He is oriented to person, place, and time. He appears well-developed and well-nourished.   Cardiovascular: Normal rate and regular rhythm.    No thrill in L axillary or fem fem. Left femoral weak biphasic with weak biphasic AT.  Right monophasic femoral with monophasic PT.   Pulmonary/Chest: Effort normal. No respiratory distress.   Abdominal: Soft. He exhibits no distension. There is no tenderness.   Musculoskeletal:   Motor sensory intact bilateral feet  No wounds on either foot   Neurological: He is alert and oriented to person, place, and time.   Skin: Skin is warm and dry.       Significant Labs:

## 2018-07-12 NOTE — ANESTHESIA PREPROCEDURE EVALUATION
[facility]  Anesthesia Pre-Operative Evaluation         Patient Name: Alek Joyner  YOB: 1950  MRN: [id number]    SUBJECTIVE:     Pre-operative evaluation for Procedure(s) (LRB):  THROMBECTOMY, GRAFT, OPEN (N/A)     07/12/2018    Alek Joyner is a 68 y.o. male w/ a significant PMHx of HTN, PAD, and tobacco abuse. Patient has had multiple re-vascularizing procedures - most recently a left ax-fem-fem in 2017. He has been experiencing severe bilateral lower extremity pain over the last several days. Dr. Roldan was contacted and recommended direct admit for revascularization.    Patient now presents for the above procedure(s).      LDA: None documented.       Prev airway:   Placement Date: 01/04/17; Placement Time: 0813; Method of Intubation: Direct laryngoscopy; Inserted by: Anesthesia Resident; Airway Device: Endotracheal Tube; Mask Ventilation: Easy - oral; Intubated: Postinduction; Blade: Hebert #2; Airway Device Size: 8.0; Style: Cuffed; Cuff Inflation: Minimal occlusive pressure; Inflation Amount: 5; Placement Verified By: Auscultation, Capnometry, Chest X-ray, ETT Condensation; Grade: Grade I; Complicating Factors: None; Intubation Findings: Positive EtCO2, Bilateral breath sounds, Atraumatic/Condition of teeth unchanged;  Depth of Insertion: 23; Securment: Lips; Complications: None; Removal Date: 01/04/17;  Removal Time: 1145    Drips: None documented.      Patient Active Problem List   Diagnosis    Failing vascular bypass graft    PAOD (peripheral arterial occlusive disease)    Atherosclerotic peripheral vascular disease with rest pain    HTN (hypertension)    Tobacco abuse    PAD (peripheral artery disease)    Peripheral vascular disease, unspecified    Peripheral arterial disease       Review of patient's allergies indicates:  No Known Allergies    Current Inpatient Medications:      No current facility-administered medications on file prior to encounter.       Current Outpatient Prescriptions on File Prior to Encounter   Medication Sig Dispense Refill    aspirin (ECOTRIN) 81 MG EC tablet Take 81 mg by mouth every morning.      atorvastatin (LIPITOR) 40 MG tablet Take 1 tablet (40 mg total) by mouth once daily. 90 tablet 3    hydrocodone-acetaminophen 5-325mg (NORCO) 5-325 mg per tablet Take 1 tablet by mouth every 4 (four) hours as needed. 40 tablet 0    lisinopril-hydrochlorothiazide (PRINZIDE,ZESTORETIC) 10-12.5 mg per tablet Take 1 tablet by mouth every morning. 90 tablet 3    ondansetron (ZOFRAN-ODT) 8 MG TbDL Take 1 tablet (8 mg total) by mouth every 6 (six) hours as needed (nausea). 30 tablet 2    rivaroxaban (XARELTO) 20 mg Tab Take 1 tablet (20 mg total) by mouth once daily. 30 tablet 3       Past Surgical History:   Procedure Laterality Date    BACK SURGERY      cardiac bypass      CARDIAC SURGERY         Social History     Social History    Marital status:      Spouse name: N/A    Number of children: N/A    Years of education: N/A     Occupational History    Not on file.     Social History Main Topics    Smoking status: Current Some Day Smoker     Types: Cigarettes    Smokeless tobacco: Not on file    Alcohol use No    Drug use: No    Sexual activity: Not on file     Other Topics Concern    Not on file     Social History Narrative    No narrative on file       OBJECTIVE:     Vital Signs Range (Last 24H):         CBC:   No results for input(s): WBC, RBC, HGB, HCT, PLT, MCV, MCH, MCHC in the last 72 hours.    CMP: No results for input(s): NA, K, CL, CO2, BUN, CREATININE, GLU, MG, PHOS, CALCIUM, ALBUMIN, PROT, ALKPHOS, ALT, AST, BILITOT in the last 72 hours.    INR:  No results for input(s): PT, INR, PROTIME, APTT in the last 72 hours.    Diagnostic Studies: No relevant studies.    EKG: No recent studies available.    2D ECHO:  No results found for this or any previous visit.      ASSESSMENT/PLAN:         Anesthesia Evaluation    I  have reviewed the Patient Summary Reports.    I have reviewed the Nursing Notes.   I have reviewed the Medications.     Review of Systems  Anesthesia Hx:  No problems with previous Anesthesia  History of prior surgery of interest to airway management or planning: Denies Family Hx of Anesthesia complications.   Denies Personal Hx of Anesthesia complications.   Social:  Smoker, No Alcohol Use    Hematology/Oncology:  Hematology Normal   Oncology Normal     EENT/Dental:EENT/Dental Normal   Cardiovascular:   Exercise tolerance: good Hypertension     PVD ECG has been reviewed. Aorto bifem 2011  Rt ax fem fem 2016   Pulmonary:  Pulmonary Normal  Denies Shortness of breath.    Musculoskeletal:  Musculoskeletal Normal    Neurological:  Neurology Normal    Endocrine:  Endocrine Normal    Dermatological:  Skin Normal    Psych:  Psychiatric Normal           Physical Exam  General:  Well nourished    Airway/Jaw/Neck:  Airway Findings: Mouth Opening: Normal Tongue: Normal  General Airway Assessment: Adult, Good  Mallampati: II  TM Distance: Normal, at least 6 cm        Eyes/Ears/Nose:  EYES/EARS/NOSE FINDINGS: Normal   Dental:  Dental Findings: Edentulous, Upper Dentures, Lower Dentures   Chest/Lungs:  Chest/Lungs Clear    Heart/Vascular:  Heart Findings: Normal    Abdomen:  Abdomen Findings: Normal      Mental Status:  Mental Status Findings:  Cooperative, Alert and Oriented         Anesthesia Plan  Type of Anesthesia, risks & benefits discussed:  Anesthesia Type:  general  Patient's Preference:   Intra-op Monitoring Plan: standard ASA monitors and arterial line  Intra-op Monitoring Plan Comments:   Post Op Pain Control Plan: multimodal analgesia, IV/PO Opioids PRN and per primary service following discharge from PACU  Post Op Pain Control Plan Comments:   Induction:   IV  Beta Blocker:  Patient is not currently on a Beta-Blocker (No further documentation required).       Informed Consent:    ASA Score: 3     Day of Surgery  Review of History & Physical:            Ready For Surgery From Anesthesia Perspective.

## 2018-07-12 NOTE — H&P
[facility]  Vascular Surgery  History and Physical     Patient Name: Alek Joyner  MRN: [id number]  Admission Date: 7/12/2018  Code Status: Full Code   Attending Physician: Yuli  Primary Care Physician: Jan Jorgensen MD    Subjective:     Chief Complaint/Reason for Admission: rest pain     HPI:  Patient is a 65 yo M with h/o long term tobacco use (now has quit) and severe PAOD (h/o ABF in 2011, R ax bifem 2017, thrombosed; L ax bifem in 1/2017) presenting with bilateral rest pain.  Patient has reports he first noticed rest pain at the end of May was seen in outside ED on 6/6/18 with CTA done demonstrating thrombosed L ax bifem bypass. Patient reports that he was referred to local surgeon and couldn't get an appointment. Finally reached out to Dr. Roldan and was directly admitted for planned Or tomorrow.    Denies MI/stroke.  Has been compliant with his xarelto.  Reports that he quit smoking.    Prescriptions Prior to Admission   Medication Sig Dispense Refill Last Dose    aspirin (ECOTRIN) 81 MG EC tablet Take 81 mg by mouth every morning.   Taking    atorvastatin (LIPITOR) 40 MG tablet Take 1 tablet (40 mg total) by mouth once daily. 90 tablet 3 Taking    hydrocodone-acetaminophen 5-325mg (NORCO) 5-325 mg per tablet Take 1 tablet by mouth every 4 (four) hours as needed. 40 tablet 0 Taking    lisinopril-hydrochlorothiazide (PRINZIDE,ZESTORETIC) 10-12.5 mg per tablet Take 1 tablet by mouth every morning. 90 tablet 3 Taking    ondansetron (ZOFRAN-ODT) 8 MG TbDL Take 1 tablet (8 mg total) by mouth every 6 (six) hours as needed (nausea). 30 tablet 2 Taking    rivaroxaban (XARELTO) 20 mg Tab Take 1 tablet (20 mg total) by mouth once daily. 30 tablet 3 Taking       Review of patient's allergies indicates:  No Known Allergies    Past Medical History:   Diagnosis Date    Hypertension      Past Surgical History:   Procedure Laterality Date    BACK SURGERY      cardiac bypass       CARDIAC SURGERY       Family History     None        Social History Main Topics    Smoking status: Current Some Day Smoker     Types: Cigarettes    Smokeless tobacco: Not on file    Alcohol use No    Drug use: No    Sexual activity: Not on file     Review of Systems   Constitutional: Positive for activity change. Negative for chills.   HENT: Negative for congestion and dental problem.    Eyes: Negative for discharge.   Respiratory: Negative for apnea.    Cardiovascular: Negative for chest pain.   Gastrointestinal: Negative for abdominal distention.   Musculoskeletal: Positive for myalgias.   Skin: Negative for color change and wound.   Hematological: Negative for adenopathy. Bruises/bleeds easily.   Psychiatric/Behavioral: Negative for agitation and behavioral problems.     Objective:     Vital Signs (Most Recent):  Temp: 97.7 °F (36.5 °C) (07/12/18 1114)  Pulse: 82 (07/12/18 1114)  Resp: 17 (07/12/18 1114)  BP: (!) 106/55 (07/12/18 1114)  SpO2: 96 % (07/12/18 1114) Vital Signs (24h Range):  Temp:  [97.7 °F (36.5 °C)] 97.7 °F (36.5 °C)  Pulse:  [82] 82  Resp:  [17] 17  SpO2:  [96 %] 96 %  BP: (106)/(55) 106/55        There is no height or weight on file to calculate BMI.    Physical Exam   Constitutional: He is oriented to person, place, and time. He appears well-developed and well-nourished.   Cardiovascular: Normal rate and regular rhythm.    No thrill in L axillary or fem fem. Left femoral weak biphasic with weak biphasic AT.  Right monophasic femoral with monophasic PT.   Pulmonary/Chest: Effort normal. No respiratory distress.   Abdominal: Soft. He exhibits no distension. There is no tenderness.   Musculoskeletal:   Motor sensory intact bilateral feet  No wounds on either foot   Neurological: He is alert and oriented to person, place, and time.   Skin: Skin is warm and dry.       Significant Labs:          Assessment and Plan:     * Arterial graft thrombosis, sequela    Patient is a 67 yo M with h/o  long term tobacco use (now has quit) and severe PAOD (h/o ABF in 2011, R ax bifem 2017, thrombosed; L ax bifem in 1/2017) presenting with bilateral rest pain and occlusion of L ax bifem at least since 6/6/18.    Patient with > 4 weeks thrombosis of L ax bifemoral bypass graft presenting with bilateral rest pain   Last dose of xarelto was yesterday  Will attempt open thrombectomy of L axillary bifemoral bypass but secondary to chronicity may be unsuccessful; risk of this explained to patient  NPO at [place]            Anamika Talbert MD  Vascular Surgery  [facility]

## 2018-07-12 NOTE — PLAN OF CARE
Patient lives in a 1 story house, w/2 SHE (no railings), w/spouse. Spouse at BS. Surgery scheduled for tomorrow. Curretntly no needs determined.     [facility] given to patient after informed about it;patient verbalized their understanding.        07/12/18 1420   Discharge Assessment   Assessment Type Discharge Planning Assessment   Confirmed/corrected address and phone number on facesheet? Yes   Assessment information obtained from? Patient;Medical Record   Expected Length of Stay (days) (TBD/? 3-4 DAYS)   Communicated expected length of stay with patient/caregiver no  (Per MD)   Prior to hospitilization cognitive status: Alert/Oriented;No Deficits   Prior to hospitalization functional status: Independent   Current cognitive status: Alert/Oriented;No Deficits   Current Functional Status: Independent   Facility Arrived From: (N/A)   Lives With spouse   Able to Return to Prior Arrangements yes   Is patient able to care for self after discharge? Yes   Who are your caregiver(s) and their phone number(s)? (Cristina Joyner Spouse [phone]. Daughter available to help as he needs/Irene Frederick Daughter [phone]   )   Patient's perception of discharge disposition home or selfcare   Readmission Within The Last 30 Days no previous admission in last 30 days   Patient currently being followed by outpatient case management? No   Patient currently receives any other outside agency services? No   Equipment Currently Used at Home none   Do you have any problems affording any of your prescribed medications? No   Is the patient taking medications as prescribed? yes   Does the patient have transportation home? Yes   Transportation Available car;family or friend will provide   Dialysis Name and Scheduled days (N/A)   Does the patient receive services at the [facility]mad[facility]? No   Discharge Plan A Home with family   Discharge Plan B Home with family   Patient/Family In Agreement With Plan yes

## 2018-07-12 NOTE — ASSESSMENT & PLAN NOTE
Patient is a 65 yo M with h/o long term tobacco use (now has quit) and severe PAOD (h/o ABF in 2011, R ax bifem 2017, thrombosed; L ax bifem in 1/2017) presenting with bilateral rest pain and occlusion of L ax bifem at least since 6/6/18.    Patient with > 4 weeks thrombosis of L ax bifemoral bypass graft presenting with bilateral rest pain   Last dose of xarelto was yesterday  Will attempt open thrombectomy of L axillary bifemoral bypass but secondary to chronicity may be unsuccessful; risk of this explained to patient  NPO at [place]

## 2018-07-12 NOTE — HPI
Patient is a 67 yo M with h/o long term tobacco use (now has quit) and severe PAOD (h/o ABF in 2011, R ax bifem 2017, thrombosed; L ax bifem in 1/2017) presenting with bilateral rest pain.  Patient has reports he first noticed rest pain at the end of May was seen in outside ED on 6/6/18 with CTA done demonstrating thrombosed L ax bifem bypass. Patient reports that he was referred to local surgeon and couldn't get an appointment. Finally reached out to Dr. Roldan and was directly admitted for planned Or tomorrow.    Denies MI/stroke.  Has been compliant with his xarelto.  Reports that he quit smoking.

## 2018-07-13 ENCOUNTER — ANESTHESIA (OUTPATIENT)
Dept: SURGERY | Facility: HOSPITAL | Age: 68
DRG: 253 | End: 2018-07-13
Payer: MEDICARE

## 2018-07-13 ENCOUNTER — SURGERY (OUTPATIENT)
Age: 68
End: 2018-07-13

## 2018-07-13 LAB
ALBUMIN SERPL BCP-MCNC: 3 G/DL
ALBUMIN SERPL BCP-MCNC: 3 G/DL
ALP SERPL-CCNC: 82 U/L
ALP SERPL-CCNC: 83 U/L
ALT SERPL W/O P-5'-P-CCNC: 19 U/L
ALT SERPL W/O P-5'-P-CCNC: 19 U/L
ANION GAP SERPL CALC-SCNC: 10 MMOL/L
ANION GAP SERPL CALC-SCNC: 5 MMOL/L
ANION GAP SERPL CALC-SCNC: 6 MMOL/L
AST SERPL-CCNC: 20 U/L
AST SERPL-CCNC: 21 U/L
BASOPHILS # BLD AUTO: 0.04 K/UL
BASOPHILS # BLD AUTO: 0.05 K/UL
BASOPHILS # BLD AUTO: 0.05 K/UL
BASOPHILS NFR BLD: 0.4 %
BASOPHILS NFR BLD: 0.5 %
BASOPHILS NFR BLD: 0.6 %
BILIRUB SERPL-MCNC: 0.4 MG/DL
BILIRUB SERPL-MCNC: 0.5 MG/DL
BUN SERPL-MCNC: 14 MG/DL
BUN SERPL-MCNC: 15 MG/DL
BUN SERPL-MCNC: 15 MG/DL
CALCIUM SERPL-MCNC: 7.7 MG/DL
CALCIUM SERPL-MCNC: 8.8 MG/DL
CALCIUM SERPL-MCNC: 8.9 MG/DL
CHLORIDE SERPL-SCNC: 109 MMOL/L
CHLORIDE SERPL-SCNC: 110 MMOL/L
CHLORIDE SERPL-SCNC: 110 MMOL/L
CO2 SERPL-SCNC: 19 MMOL/L
CO2 SERPL-SCNC: 24 MMOL/L
CO2 SERPL-SCNC: 25 MMOL/L
CREAT SERPL-MCNC: 0.8 MG/DL
CREAT SERPL-MCNC: 1 MG/DL
CREAT SERPL-MCNC: 1 MG/DL
DIFFERENTIAL METHOD: ABNORMAL
EOSINOPHIL # BLD AUTO: 0.2 K/UL
EOSINOPHIL NFR BLD: 1.8 %
EOSINOPHIL NFR BLD: 2.8 %
EOSINOPHIL NFR BLD: 2.9 %
ERYTHROCYTE [DISTWIDTH] IN BLOOD BY AUTOMATED COUNT: 12.8 %
ERYTHROCYTE [DISTWIDTH] IN BLOOD BY AUTOMATED COUNT: 12.8 %
ERYTHROCYTE [DISTWIDTH] IN BLOOD BY AUTOMATED COUNT: 12.9 %
EST. GFR  (AFRICAN AMERICAN): >60 ML/MIN/1.73 M^2
EST. GFR  (NON AFRICAN AMERICAN): >60 ML/MIN/1.73 M^2
GLUCOSE SERPL-MCNC: 101 MG/DL
GLUCOSE SERPL-MCNC: 89 MG/DL
GLUCOSE SERPL-MCNC: 89 MG/DL
HCT VFR BLD AUTO: 27.5 %
HCT VFR BLD AUTO: 31.7 %
HCT VFR BLD AUTO: 32.3 %
HGB BLD-MCNC: 10.3 G/DL
HGB BLD-MCNC: 10.4 G/DL
HGB BLD-MCNC: 8.8 G/DL
IMM GRANULOCYTES # BLD AUTO: 0.03 K/UL
IMM GRANULOCYTES # BLD AUTO: 0.03 K/UL
IMM GRANULOCYTES # BLD AUTO: 0.11 K/UL
IMM GRANULOCYTES NFR BLD AUTO: 0.4 %
IMM GRANULOCYTES NFR BLD AUTO: 0.4 %
IMM GRANULOCYTES NFR BLD AUTO: 0.9 %
LYMPHOCYTES # BLD AUTO: 2 K/UL
LYMPHOCYTES # BLD AUTO: 2.1 K/UL
LYMPHOCYTES # BLD AUTO: 3.4 K/UL
LYMPHOCYTES NFR BLD: 24.7 %
LYMPHOCYTES NFR BLD: 25.8 %
LYMPHOCYTES NFR BLD: 28.8 %
MAGNESIUM SERPL-MCNC: 1.9 MG/DL
MAGNESIUM SERPL-MCNC: 1.9 MG/DL
MCH RBC QN AUTO: 30.2 PG
MCH RBC QN AUTO: 30.3 PG
MCH RBC QN AUTO: 30.7 PG
MCHC RBC AUTO-ENTMCNC: 31.9 G/DL
MCHC RBC AUTO-ENTMCNC: 32 G/DL
MCHC RBC AUTO-ENTMCNC: 32.8 G/DL
MCV RBC AUTO: 94 FL
MCV RBC AUTO: 95 FL
MCV RBC AUTO: 95 FL
MONOCYTES # BLD AUTO: 0.7 K/UL
MONOCYTES # BLD AUTO: 0.8 K/UL
MONOCYTES # BLD AUTO: 1 K/UL
MONOCYTES NFR BLD: 8.4 %
MONOCYTES NFR BLD: 8.4 %
MONOCYTES NFR BLD: 9.2 %
NEUTROPHILS # BLD AUTO: 5.1 K/UL
NEUTROPHILS # BLD AUTO: 5.1 K/UL
NEUTROPHILS # BLD AUTO: 7.1 K/UL
NEUTROPHILS NFR BLD: 59.7 %
NEUTROPHILS NFR BLD: 62 %
NEUTROPHILS NFR BLD: 62.3 %
NRBC BLD-RTO: 0 /100 WBC
PHOSPHATE SERPL-MCNC: 2.8 MG/DL
PHOSPHATE SERPL-MCNC: 2.8 MG/DL
PLATELET # BLD AUTO: 293 K/UL
PLATELET # BLD AUTO: 309 K/UL
PLATELET # BLD AUTO: 312 K/UL
PMV BLD AUTO: 8.9 FL
PMV BLD AUTO: 9 FL
PMV BLD AUTO: 9.3 FL
POC ACTIVATED CLOTTING TIME K: 208 SEC (ref 74–137)
POC ACTIVATED CLOTTING TIME K: 208 SEC (ref 74–137)
POC ACTIVATED CLOTTING TIME K: 213 SEC (ref 74–137)
POC ACTIVATED CLOTTING TIME K: 219 SEC (ref 74–137)
POC ACTIVATED CLOTTING TIME K: 224 SEC (ref 74–137)
POC ACTIVATED CLOTTING TIME K: 230 SEC (ref 74–137)
POTASSIUM SERPL-SCNC: 4.1 MMOL/L
POTASSIUM SERPL-SCNC: 4.6 MMOL/L
POTASSIUM SERPL-SCNC: 4.7 MMOL/L
PROT SERPL-MCNC: 5.7 G/DL
PROT SERPL-MCNC: 5.7 G/DL
RBC # BLD AUTO: 2.9 M/UL
RBC # BLD AUTO: 3.39 M/UL
RBC # BLD AUTO: 3.41 M/UL
SAMPLE: ABNORMAL
SODIUM SERPL-SCNC: 139 MMOL/L
SODIUM SERPL-SCNC: 139 MMOL/L
SODIUM SERPL-SCNC: 140 MMOL/L
WBC # BLD AUTO: 11.95 K/UL
WBC # BLD AUTO: 8.14 K/UL
WBC # BLD AUTO: 8.23 K/UL

## 2018-07-13 PROCEDURE — 71000039 HC RECOVERY, EACH ADD'L HOUR: Performed by: SURGERY

## 2018-07-13 PROCEDURE — C1887 CATHETER, GUIDING: HCPCS | Performed by: SURGERY

## 2018-07-13 PROCEDURE — 36000707: Performed by: SURGERY

## 2018-07-13 PROCEDURE — 25000003 PHARM REV CODE 250: Performed by: NURSE ANESTHETIST, CERTIFIED REGISTERED

## 2018-07-13 PROCEDURE — 35876 REMOVAL OF CLOT IN GRAFT: CPT | Mod: LT,,, | Performed by: SURGERY

## 2018-07-13 PROCEDURE — 20600001 HC STEP DOWN PRIVATE ROOM

## 2018-07-13 PROCEDURE — 63600175 PHARM REV CODE 636 W HCPCS: Performed by: NURSE ANESTHETIST, CERTIFIED REGISTERED

## 2018-07-13 PROCEDURE — 80048 BASIC METABOLIC PNL TOTAL CA: CPT

## 2018-07-13 PROCEDURE — 25000003 PHARM REV CODE 250: Performed by: SURGERY

## 2018-07-13 PROCEDURE — 04CK0ZZ EXTIRPATION OF MATTER FROM RIGHT FEMORAL ARTERY, OPEN APPROACH: ICD-10-PCS | Performed by: SURGERY

## 2018-07-13 PROCEDURE — 27200677 HC TRANSDUCER MONITOR KIT SINGLE: Performed by: NURSE ANESTHETIST, CERTIFIED REGISTERED

## 2018-07-13 PROCEDURE — B41FYZZ FLUOROSCOPY OF RIGHT LOWER EXTREMITY ARTERIES USING OTHER CONTRAST: ICD-10-PCS | Performed by: SURGERY

## 2018-07-13 PROCEDURE — C1751 CATH, INF, PER/CENT/MIDLINE: HCPCS | Performed by: NURSE ANESTHETIST, CERTIFIED REGISTERED

## 2018-07-13 PROCEDURE — 63600175 PHARM REV CODE 636 W HCPCS: Performed by: STUDENT IN AN ORGANIZED HEALTH CARE EDUCATION/TRAINING PROGRAM

## 2018-07-13 PROCEDURE — 047L0ZZ DILATION OF LEFT FEMORAL ARTERY, OPEN APPROACH: ICD-10-PCS | Performed by: SURGERY

## 2018-07-13 PROCEDURE — 04CL0ZZ EXTIRPATION OF MATTER FROM LEFT FEMORAL ARTERY, OPEN APPROACH: ICD-10-PCS | Performed by: SURGERY

## 2018-07-13 PROCEDURE — C1729 CATH, DRAINAGE: HCPCS | Performed by: SURGERY

## 2018-07-13 PROCEDURE — 37000009 HC ANESTHESIA EA ADD 15 MINS: Performed by: SURGERY

## 2018-07-13 PROCEDURE — 84100 ASSAY OF PHOSPHORUS: CPT

## 2018-07-13 PROCEDURE — 80053 COMPREHEN METABOLIC PANEL: CPT | Mod: 91

## 2018-07-13 PROCEDURE — 27201423 OPTIME MED/SURG SUP & DEVICES STERILE SUPPLY: Performed by: SURGERY

## 2018-07-13 PROCEDURE — 85025 COMPLETE CBC W/AUTO DIFF WBC: CPT | Mod: 91

## 2018-07-13 PROCEDURE — 36620 INSERTION CATHETER ARTERY: CPT | Mod: 59,,, | Performed by: ANESTHESIOLOGY

## 2018-07-13 PROCEDURE — 63600175 PHARM REV CODE 636 W HCPCS: Performed by: SURGERY

## 2018-07-13 PROCEDURE — C1725 CATH, TRANSLUMIN NON-LASER: HCPCS | Performed by: SURGERY

## 2018-07-13 PROCEDURE — C1757 CATH, THROMBECTOMY/EMBOLECT: HCPCS | Performed by: SURGERY

## 2018-07-13 PROCEDURE — 36000706: Performed by: SURGERY

## 2018-07-13 PROCEDURE — 27100025 HC TUBING, SET FLUID WARMER: Performed by: NURSE ANESTHETIST, CERTIFIED REGISTERED

## 2018-07-13 PROCEDURE — B41GYZZ FLUOROSCOPY OF LEFT LOWER EXTREMITY ARTERIES USING OTHER CONTRAST: ICD-10-PCS | Performed by: SURGERY

## 2018-07-13 PROCEDURE — 047K0ZZ DILATION OF RIGHT FEMORAL ARTERY, OPEN APPROACH: ICD-10-PCS | Performed by: SURGERY

## 2018-07-13 PROCEDURE — C1768 GRAFT, VASCULAR: HCPCS | Performed by: SURGERY

## 2018-07-13 PROCEDURE — 94761 N-INVAS EAR/PLS OXIMETRY MLT: CPT

## 2018-07-13 PROCEDURE — 83735 ASSAY OF MAGNESIUM: CPT | Mod: 91

## 2018-07-13 PROCEDURE — 36415 COLL VENOUS BLD VENIPUNCTURE: CPT

## 2018-07-13 PROCEDURE — 71000033 HC RECOVERY, INTIAL HOUR: Performed by: SURGERY

## 2018-07-13 PROCEDURE — 35875 REMOVAL OF CLOT IN GRAFT: CPT | Mod: XS,51,RT, | Performed by: SURGERY

## 2018-07-13 PROCEDURE — P9045 ALBUMIN (HUMAN), 5%, 250 ML: HCPCS | Mod: JG | Performed by: NURSE ANESTHETIST, CERTIFIED REGISTERED

## 2018-07-13 PROCEDURE — C1769 GUIDE WIRE: HCPCS | Performed by: SURGERY

## 2018-07-13 PROCEDURE — 25500020 PHARM REV CODE 255: Performed by: SURGERY

## 2018-07-13 PROCEDURE — 27000221 HC OXYGEN, UP TO 24 HOURS

## 2018-07-13 PROCEDURE — D9220A PRA ANESTHESIA: Mod: CRNA,,, | Performed by: NURSE ANESTHETIST, CERTIFIED REGISTERED

## 2018-07-13 PROCEDURE — D9220A PRA ANESTHESIA: Mod: ANES,,, | Performed by: ANESTHESIOLOGY

## 2018-07-13 PROCEDURE — 37000008 HC ANESTHESIA 1ST 15 MINUTES: Performed by: SURGERY

## 2018-07-13 DEVICE — GRAFT VAS GUARD 0.8X8CM BOVINE: Type: IMPLANTABLE DEVICE | Site: GROIN | Status: FUNCTIONAL

## 2018-07-13 RX ORDER — FENTANYL CITRATE 50 UG/ML
INJECTION, SOLUTION INTRAMUSCULAR; INTRAVENOUS
Status: DISCONTINUED | OUTPATIENT
Start: 2018-07-13 | End: 2018-07-13

## 2018-07-13 RX ORDER — SODIUM CHLORIDE 0.9 % (FLUSH) 0.9 %
3 SYRINGE (ML) INJECTION
Status: DISCONTINUED | OUTPATIENT
Start: 2018-07-13 | End: 2018-07-13 | Stop reason: HOSPADM

## 2018-07-13 RX ORDER — IODIXANOL 320 MG/ML
INJECTION, SOLUTION INTRAVASCULAR
Status: DISCONTINUED | OUTPATIENT
Start: 2018-07-13 | End: 2018-07-13 | Stop reason: HOSPADM

## 2018-07-13 RX ORDER — ROCURONIUM BROMIDE 10 MG/ML
INJECTION, SOLUTION INTRAVENOUS
Status: DISCONTINUED | OUTPATIENT
Start: 2018-07-13 | End: 2018-07-13

## 2018-07-13 RX ORDER — ALBUMIN HUMAN 50 G/1000ML
SOLUTION INTRAVENOUS CONTINUOUS PRN
Status: DISCONTINUED | OUTPATIENT
Start: 2018-07-13 | End: 2018-07-13

## 2018-07-13 RX ORDER — ONDANSETRON 2 MG/ML
4 INJECTION INTRAMUSCULAR; INTRAVENOUS ONCE AS NEEDED
Status: DISCONTINUED | OUTPATIENT
Start: 2018-07-13 | End: 2018-07-13 | Stop reason: HOSPADM

## 2018-07-13 RX ORDER — GLYCOPYRROLATE 0.2 MG/ML
INJECTION INTRAMUSCULAR; INTRAVENOUS
Status: DISCONTINUED | OUTPATIENT
Start: 2018-07-13 | End: 2018-07-13

## 2018-07-13 RX ORDER — OXYCODONE AND ACETAMINOPHEN 5; 325 MG/1; MG/1
1 TABLET ORAL EVERY 4 HOURS PRN
Status: DISCONTINUED | OUTPATIENT
Start: 2018-07-13 | End: 2018-07-15 | Stop reason: HOSPADM

## 2018-07-13 RX ORDER — ONDANSETRON 2 MG/ML
INJECTION INTRAMUSCULAR; INTRAVENOUS
Status: DISCONTINUED | OUTPATIENT
Start: 2018-07-13 | End: 2018-07-13

## 2018-07-13 RX ORDER — SODIUM CHLORIDE, SODIUM LACTATE, POTASSIUM CHLORIDE, CALCIUM CHLORIDE 600; 310; 30; 20 MG/100ML; MG/100ML; MG/100ML; MG/100ML
INJECTION, SOLUTION INTRAVENOUS CONTINUOUS
Status: DISCONTINUED | OUTPATIENT
Start: 2018-07-13 | End: 2018-07-14

## 2018-07-13 RX ORDER — PHENYLEPHRINE HYDROCHLORIDE 10 MG/ML
INJECTION INTRAVENOUS
Status: DISCONTINUED | OUTPATIENT
Start: 2018-07-13 | End: 2018-07-13

## 2018-07-13 RX ORDER — SODIUM CHLORIDE 0.9 % (FLUSH) 0.9 %
3 SYRINGE (ML) INJECTION
Status: DISCONTINUED | OUTPATIENT
Start: 2018-07-13 | End: 2018-07-15 | Stop reason: HOSPADM

## 2018-07-13 RX ORDER — HEPARIN SODIUM 1000 [USP'U]/ML
INJECTION, SOLUTION INTRAVENOUS; SUBCUTANEOUS
Status: DISCONTINUED | OUTPATIENT
Start: 2018-07-13 | End: 2018-07-13 | Stop reason: HOSPADM

## 2018-07-13 RX ORDER — MIDAZOLAM HYDROCHLORIDE 1 MG/ML
INJECTION, SOLUTION INTRAMUSCULAR; INTRAVENOUS
Status: DISCONTINUED | OUTPATIENT
Start: 2018-07-13 | End: 2018-07-13

## 2018-07-13 RX ORDER — HEPARIN SODIUM 10000 [USP'U]/100ML
500 INJECTION, SOLUTION INTRAVENOUS CONTINUOUS
Status: DISCONTINUED | OUTPATIENT
Start: 2018-07-13 | End: 2018-07-15

## 2018-07-13 RX ORDER — CEFAZOLIN SODIUM 1 G/3ML
INJECTION, POWDER, FOR SOLUTION INTRAMUSCULAR; INTRAVENOUS
Status: DISCONTINUED | OUTPATIENT
Start: 2018-07-13 | End: 2018-07-13

## 2018-07-13 RX ORDER — NEOSTIGMINE METHYLSULFATE 1 MG/ML
INJECTION, SOLUTION INTRAVENOUS
Status: DISCONTINUED | OUTPATIENT
Start: 2018-07-13 | End: 2018-07-13

## 2018-07-13 RX ORDER — HEPARIN SODIUM 1000 [USP'U]/ML
INJECTION, SOLUTION INTRAVENOUS; SUBCUTANEOUS
Status: DISCONTINUED | OUTPATIENT
Start: 2018-07-13 | End: 2018-07-13

## 2018-07-13 RX ORDER — LIDOCAINE HCL/PF 100 MG/5ML
SYRINGE (ML) INTRAVENOUS
Status: DISCONTINUED | OUTPATIENT
Start: 2018-07-13 | End: 2018-07-13

## 2018-07-13 RX ORDER — PROPOFOL 10 MG/ML
VIAL (ML) INTRAVENOUS
Status: DISCONTINUED | OUTPATIENT
Start: 2018-07-13 | End: 2018-07-13

## 2018-07-13 RX ORDER — FENTANYL CITRATE 50 UG/ML
25 INJECTION, SOLUTION INTRAMUSCULAR; INTRAVENOUS EVERY 5 MIN PRN
Status: DISCONTINUED | OUTPATIENT
Start: 2018-07-13 | End: 2018-07-13 | Stop reason: HOSPADM

## 2018-07-13 RX ADMIN — SODIUM CHLORIDE, SODIUM LACTATE, POTASSIUM CHLORIDE, AND CALCIUM CHLORIDE: .6; .31; .03; .02 INJECTION, SOLUTION INTRAVENOUS at 04:07

## 2018-07-13 RX ADMIN — HYDROCODONE BITARTRATE AND ACETAMINOPHEN 1 TABLET: 5; 325 TABLET ORAL at 09:07

## 2018-07-13 RX ADMIN — FENTANYL CITRATE 25 MCG: 50 INJECTION, SOLUTION INTRAMUSCULAR; INTRAVENOUS at 03:07

## 2018-07-13 RX ADMIN — PHENYLEPHRINE HYDROCHLORIDE 200 MCG: 10 INJECTION INTRAVENOUS at 12:07

## 2018-07-13 RX ADMIN — ASPIRIN 81 MG: 81 TABLET, COATED ORAL at 06:07

## 2018-07-13 RX ADMIN — PHENYLEPHRINE HYDROCHLORIDE 200 MCG: 10 INJECTION INTRAVENOUS at 02:07

## 2018-07-13 RX ADMIN — HYDROCODONE BITARTRATE AND ACETAMINOPHEN 1 TABLET: 5; 325 TABLET ORAL at 05:07

## 2018-07-13 RX ADMIN — PHENYLEPHRINE HYDROCHLORIDE 100 MCG: 10 INJECTION INTRAVENOUS at 03:07

## 2018-07-13 RX ADMIN — SODIUM CHLORIDE, SODIUM GLUCONATE, SODIUM ACETATE, POTASSIUM CHLORIDE, MAGNESIUM CHLORIDE, SODIUM PHOSPHATE, DIBASIC, AND POTASSIUM PHOSPHATE: .53; .5; .37; .037; .03; .012; .00082 INJECTION, SOLUTION INTRAVENOUS at 12:07

## 2018-07-13 RX ADMIN — HEPARIN SODIUM 500 UNITS/HR: 10000 INJECTION, SOLUTION INTRAVENOUS at 10:07

## 2018-07-13 RX ADMIN — ALBUMIN (HUMAN): 12.5 SOLUTION INTRAVENOUS at 03:07

## 2018-07-13 RX ADMIN — FENTANYL CITRATE 50 MCG: 50 INJECTION, SOLUTION INTRAMUSCULAR; INTRAVENOUS at 02:07

## 2018-07-13 RX ADMIN — HEPARIN SODIUM 2000 UNITS: 1000 INJECTION, SOLUTION INTRAVENOUS; SUBCUTANEOUS at 01:07

## 2018-07-13 RX ADMIN — MIDAZOLAM HYDROCHLORIDE 1 MG: 1 INJECTION, SOLUTION INTRAMUSCULAR; INTRAVENOUS at 12:07

## 2018-07-13 RX ADMIN — FENTANYL CITRATE 100 MCG: 50 INJECTION, SOLUTION INTRAMUSCULAR; INTRAVENOUS at 12:07

## 2018-07-13 RX ADMIN — ROCURONIUM BROMIDE 10 MG: 10 INJECTION, SOLUTION INTRAVENOUS at 12:07

## 2018-07-13 RX ADMIN — PHENYLEPHRINE HYDROCHLORIDE 200 MCG: 10 INJECTION INTRAVENOUS at 03:07

## 2018-07-13 RX ADMIN — HEPARIN SODIUM 10000 UNITS: 1000 INJECTION, SOLUTION INTRAVENOUS; SUBCUTANEOUS at 01:07

## 2018-07-13 RX ADMIN — HEPARIN SODIUM 1000 UNITS: 1000 INJECTION, SOLUTION INTRAVENOUS; SUBCUTANEOUS at 01:07

## 2018-07-13 RX ADMIN — HEPARIN SODIUM 8000 UNITS: 1000 INJECTION, SOLUTION INTRAVENOUS; SUBCUTANEOUS at 01:07

## 2018-07-13 RX ADMIN — HEPARIN SODIUM 3000 UNITS: 1000 INJECTION, SOLUTION INTRAVENOUS; SUBCUTANEOUS at 02:07

## 2018-07-13 RX ADMIN — SODIUM CHLORIDE 0.3 MCG/KG/MIN: 9 INJECTION, SOLUTION INTRAVENOUS at 12:07

## 2018-07-13 RX ADMIN — ONDANSETRON 4 MG: 2 INJECTION INTRAMUSCULAR; INTRAVENOUS at 04:07

## 2018-07-13 RX ADMIN — ROCURONIUM BROMIDE 40 MG: 10 INJECTION, SOLUTION INTRAVENOUS at 12:07

## 2018-07-13 RX ADMIN — PHENYLEPHRINE HYDROCHLORIDE 200 MCG: 10 INJECTION INTRAVENOUS at 01:07

## 2018-07-13 RX ADMIN — GLYCOPYRROLATE 0.4 MG: 0.2 INJECTION, SOLUTION INTRAMUSCULAR; INTRAVENOUS at 04:07

## 2018-07-13 RX ADMIN — ROCURONIUM BROMIDE 30 MG: 10 INJECTION, SOLUTION INTRAVENOUS at 02:07

## 2018-07-13 RX ADMIN — IODIXANOL 46 ML: 320 INJECTION, SOLUTION INTRAVASCULAR at 02:07

## 2018-07-13 RX ADMIN — PHENYLEPHRINE HYDROCHLORIDE 100 MCG: 10 INJECTION INTRAVENOUS at 12:07

## 2018-07-13 RX ADMIN — LIDOCAINE HYDROCHLORIDE 60 MG: 20 INJECTION, SOLUTION INTRAVENOUS at 12:07

## 2018-07-13 RX ADMIN — NEOSTIGMINE METHYLSULFATE 5 MG: 1 INJECTION INTRAVENOUS at 04:07

## 2018-07-13 RX ADMIN — PROPOFOL 200 MG: 10 INJECTION, EMULSION INTRAVENOUS at 12:07

## 2018-07-13 RX ADMIN — SODIUM CHLORIDE, SODIUM GLUCONATE, SODIUM ACETATE, POTASSIUM CHLORIDE, MAGNESIUM CHLORIDE, SODIUM PHOSPHATE, DIBASIC, AND POTASSIUM PHOSPHATE: .53; .5; .37; .037; .03; .012; .00082 INJECTION, SOLUTION INTRAVENOUS at 03:07

## 2018-07-13 RX ADMIN — CEFAZOLIN 2 G: 330 INJECTION, POWDER, FOR SOLUTION INTRAMUSCULAR; INTRAVENOUS at 12:07

## 2018-07-13 RX ADMIN — ALBUMIN (HUMAN): 12.5 SOLUTION INTRAVENOUS at 04:07

## 2018-07-13 RX ADMIN — ATORVASTATIN CALCIUM 40 MG: 20 TABLET, FILM COATED ORAL at 05:07

## 2018-07-13 NOTE — PROGRESS NOTES
Pt arrived to floor aaox4.  Palpable pedal pulses elissa.  Pt denies numbness/tingling.   Pt to lay flat until 2015.  Martini in place. A line leveled and correlating with cuff pressure.  Heparin gtt to be started tonight at 2200.  L groin dressing CDI.  IVF infusing.  Bed locked, lowest position.  3 side rails up.  Call bell in reach.  Pt and his wife oriented to room and call bell.  WCTM.

## 2018-07-13 NOTE — SUBJECTIVE & OBJECTIVE
Prescriptions Prior to Admission   Medication Sig Dispense Refill Last Dose    rivaroxaban (XARELTO) 20 mg Tab Take 1 tablet (20 mg total) by mouth once daily. 30 tablet 3 Past Week at Unknown time    aspirin (ECOTRIN) 81 MG EC tablet Take 81 mg by mouth every morning.   More than a month at Unknown time    atorvastatin (LIPITOR) 40 MG tablet Take 1 tablet (40 mg total) by mouth once daily. 90 tablet 3 Taking    hydrocodone-acetaminophen 5-325mg (NORCO) 5-325 mg per tablet Take 1 tablet by mouth every 4 (four) hours as needed. 40 tablet 0 More than a month at Unknown time    lisinopril-hydrochlorothiazide (PRINZIDE,ZESTORETIC) 10-12.5 mg per tablet Take 1 tablet by mouth every morning. 90 tablet 3 Taking    ondansetron (ZOFRAN-ODT) 8 MG TbDL Take 1 tablet (8 mg total) by mouth every 6 (six) hours as needed (nausea). 30 tablet 2 More than a month at Unknown time       Review of patient's allergies indicates:  No Known Allergies    Past Medical History:   Diagnosis Date    Hypertension      Past Surgical History:   Procedure Laterality Date    BACK SURGERY      cardiac bypass      CARDIAC SURGERY       Family History     None        Social History Main Topics    Smoking status: Current Some Day Smoker     Types: Cigarettes    Smokeless tobacco: Not on file    Alcohol use No    Drug use: No    Sexual activity: Not on file     Review of Systems   Constitutional: Positive for activity change. Negative for chills.   HENT: Negative for congestion and dental problem.    Eyes: Negative for discharge.   Respiratory: Negative for apnea.    Cardiovascular: Negative for chest pain.   Gastrointestinal: Negative for abdominal distention.   Musculoskeletal: Positive for myalgias.   Skin: Negative for color change and wound.   Hematological: Negative for adenopathy. Bruises/bleeds easily.   Psychiatric/Behavioral: Negative for agitation and behavioral problems.     Objective:     Vital Signs (Most Recent):  Temp: 96.6  °F (35.9 °C) (07/13/18 0808)  Pulse: 73 (07/13/18 0807)  Resp: 17 (07/13/18 0807)  BP: (!) 117/59 (07/13/18 0807)  SpO2: 97 % (07/13/18 0807) Vital Signs (24h Range):  Temp:  [94.6 °F (34.8 °C)-97.8 °F (36.6 °C)] 96.6 °F (35.9 °C)  Pulse:  [60-93] 73  Resp:  [16-17] 17  SpO2:  [96 %-98 %] 97 %  BP: (101-118)/(50-63) 117/59     Weight: 77.2 kg (170 lb 3.1 oz)  Body mass index is 23.74 kg/m².    Physical Exam   Constitutional: He is oriented to person, place, and time. He appears well-developed and well-nourished.   Cardiovascular: Normal rate and regular rhythm.    Bilateral DP 2+  Warm feet.   Left flank incision without hematoma, dressings dry.   Left groin incision without hematoma, dressings dry.    Pulmonary/Chest: Effort normal. No respiratory distress.   Abdominal: Soft. He exhibits no distension. There is no tenderness.   Musculoskeletal:   Motor sensory intact bilateral feet  No wounds on either foot   Neurological: He is alert and oriented to person, place, and time.   Skin: Skin is warm and dry.       Significant Labs:    Recent Labs  Lab 07/13/18  0647   WBC 8.23  8.14   RBC 3.39*  3.41*   HGB 10.4*  10.3*   HCT 31.7*  32.3*     312   MCV 94  95   MCH 30.7  30.2   MCHC 32.8  31.9*

## 2018-07-13 NOTE — ANESTHESIA PROCEDURE NOTES
Arterial    Diagnosis: Thrombus    Patient location during procedure: done in OR  Procedure start time: 7/13/2018 12:31 PM  Timeout: 7/13/2018 12:30 PM  Procedure end time: 7/13/2018 12:35 PM  Staffing  Anesthesiologist: ALBAN CERVANTES  Performed: anesthesiologist   Anesthesiologist was present at the time of the procedure.  Preanesthetic Checklist  Completed: patient identified, surgical consent, pre-op evaluation, timeout performed, IV checked, risks and benefits discussed, monitors and equipment checked and anesthesia consent givenArterial  Skin Prep: chlorhexidine gluconate  Local Infiltration: none  Orientation: right  Location: radial  Catheter Size: 20 G  Catheter placement by Anatomical landmarks. Heme positive aspiration all ports.Insertion Attempts: 2  Assessment  Dressing: secured with tape and tegaderm  Patient: Tolerated well

## 2018-07-13 NOTE — NURSING
Called MD to ask about 0700 medications. Dr. Hunt stated to hold lisinopril and give aspirin. Will continue to monitor pt

## 2018-07-13 NOTE — OP NOTE
7/13/2018    Surgeon(s) and Role:     * KWASI Roldan III, MD - Primary     Assisting Surgeon: None     Pre-op Diagnosis:  Lower limb ischemia [I99.8]     Post-op Diagnosis:  Post-Op Diagnosis Codes:     * Lower limb ischemia [I99.8]     PROCEDURES:     1. Open Thrombectomy L Axillary - L Fem bypass  2. Open thrombectomy, L femoral - R fem bypass  3. Open patch angioplasty, LEFT femoral anastomosis with bovine patch  4. Balloon angioplasty, RIGHT femoral anastamosis (6x20mm Ultraverse)  5. Left  Axillary artery angiogram, R LE angiogram     Anesthesia: General     Description of Procedure: successful hybrid revascularization of thrombosed R ax- fem and R to L fem fem bypasses     Description of the findings of the procedure: 14.4 min fluoro; 415 mGy; 46cc visipaque     Estimated Blood Loss: 500cc    Implants:   Implant Name Type Inv. Item Serial No.  Lot No. LRB No. Used   GRAFT VAS GUARD 0.8X8CM BOVINE - [id number]   GRAFT VAS GUARD 0.8X8CM BOVINE   [employer] [employer] [id number] N/A 1              Complications:  None; patient tolerated the procedure well.           Disposition: PACU.           Condition: stable    Procedure Details:  The patient was seen in the Holding Room. The risks, benefits, complications, treatment options, and expected outcomes were discussed with the patient. The patient concurred with the proposed plan, giving informed consent.  The site of surgery properly noted/marked.   Patient was brought to the hybrid operating room and positioned supine on the table. General anesthesia was administered by anesthesia team. Patient's left  was prepped and draped in usual sterile fashion using Ioban. A Time Out was held and the above information confirmed. Patient was systemically heparinized throughout the surgery.   First, we focused on establishing inflow. Axillary limb was easily palpable on patient's left flank. Transverse incision was made over the mid segment of the  axillary limb. Dissection was carried down to isolate the graft and encircled with umbilical tape. Transverse graftotomy was made and #5 Anton catheter was passed proximally towards the axillary artery. With first pass, large amount of well organized clot was removed and strong pulsatile inflow was established. Subsequent passes did not reveal any additional clots. Vick occlusion balloon was used to perform retrograde angiogram. No filling defects suggestive of residual clot or stenoses were noted. Graft was flushed with heparinized saline and clamped.   Then, #5 anton balloon was passed distally into right femoral limb just before the anastomosis. Large amount of clot was removed. Then angled glide wire was passed into right SFA and over the wire, 8F Devyn sheath was advanced into right femoral limb. Glide wire was advanced into SFA and glide cathter was used to perform runoff confirming no distal thrombus. #4 OTW Anton balloon was used to perform multiple passes through the anastomosis, retrieving decent amount of thrombus. Multiple passes were made until there were no more thrombus retrieved and there was good backflow. Right leg angiogram was performed via the sheath and 50% stenosis of the right femoral anastomosis was noted. This was treated with 6x20mm Ultraverse balloon. Excellent result noted.   Multiple attemps were made to pass the wire into the left femoral artery however was unsuccessful. Decision was made to perform direct longitudinal cutdown and open thrombectomy. Longitudinal groin incision was made using his old scar. Incision was carried down to reveal well incorporated graft with anastomosis into left femoral artery. CFA/SFA and PFA were dissected free and encircled with vessel loops, umbilical tape, and vascular clamps. Longitudinal graftotomy and proximal SFA arteriotomy was made. Well organized thrombus extending from graft to SFA was removed and #5 Anton balloon was passed multiple  times to confirm that there were no residual thrombus. Once all the thrombus was removed and surgical field irrigated, pulsatile arterial inflow was confirmed. Arterial inflow as allowed to bleed forward for couple cardiac cycles to ensure there were no further embolizing clots. Axillary limb transverse graftotomy was closed with 5-0 prolene suture in running fashion. Left femoral arteriotomy/graftotomy was closed with bovine pericardial parch and 5-0 prolene suture in running fashion. Graft/artery was appropriately back-bled and flushed prior to completing the anastomosis. Clamps were removed and patch angioplasty was completed. Doppler was used to confirm good diastolic flow through the SFA/PFA.   Hemostasis was obtained and left flank incision was closed in layers using 3-0 Vicryl and 4-0 monocryl. Same multilayer closure was performed in the right femoral incision. Skin glue was applied and dry dressings were placed.   Patient tolerated procedure well and transferred to PACU for recovery after extubation.     Patient had 2+ DP bilaterally on completion.       Dr. Roldan was scrubbed and present for entire procedure.    Jagdish Mcdaniel MD  Vascular/Endovascular Surgery Fellow

## 2018-07-13 NOTE — TRANSFER OF CARE
"Anesthesia Transfer of Care Note    Patient: Alek Joyner    Procedure(s) Performed: Procedure(s) (LRB):  THROMBECTOMY, GRAFT, OPEN (Bilateral)  ANGIOPLASTY, USING PATCH GRAFT (Left)  PTA, BYPASS GRAFT, USING BALLOON (Right)  ANGIOGRAM (Right)    Patient location: PACU    Anesthesia Type: general    Transport from OR: Transported from OR on room air with adequate spontaneous ventilation    Post pain: adequate analgesia    Post assessment: no apparent anesthetic complications    Post vital signs: stable    Level of consciousness: awake    Nausea/Vomiting: no nausea/vomiting    Complications: none    Transfer of care protocol was followed      Last vitals:   Visit Vitals  BP (!) 107/55   Pulse 90   Temp 36.6 °C (97.8 °F) (Temporal)   Resp 17   Ht 5' 11" (1.803 m)   Wt 76.2 kg (168 lb)   SpO2 (!) 93%   BMI 23.43 kg/m²     "

## 2018-07-13 NOTE — PLAN OF CARE
Problem: Patient Care Overview  Goal: Plan of Care Review  Outcome: Ongoing (interventions implemented as appropriate)  Plan of care reviewed, all questions and concerns addressed. Pt vital signs remain stable for patient, with no complaints of SOB, headaches, or dizziness. Pt urine output remains adequate, Pt is NPO. No bowel movements this shift and no complaints of nausea or vomiting. Pt denies pain. Patient is resting quietly with side rails up and call light with in reach. Will continue to monitor patient status.

## 2018-07-13 NOTE — ANESTHESIA POSTPROCEDURE EVALUATION
"Anesthesia Post Evaluation    Patient: Alek Joyner    Procedure(s) Performed: Procedure(s) (LRB):  THROMBECTOMY, GRAFT, OPEN (Bilateral)  ANGIOPLASTY, USING PATCH GRAFT (Left)  PTA, BYPASS GRAFT, USING BALLOON (Right)  ANGIOGRAM (Right)    Final Anesthesia Type: general  Patient location during evaluation: PACU  Patient participation: Yes- Able to Participate  Level of consciousness: awake and alert and oriented  Post-procedure vital signs: reviewed and stable  Pain management: adequate  Airway patency: patent  PONV status at discharge: No PONV  Anesthetic complications: no      Cardiovascular status: hemodynamically stable  Respiratory status: unassisted, spontaneous ventilation and room air  Hydration status: euvolemic  Follow-up not needed.        Visit Vitals  BP (!) 121/59   Pulse 78   Temp 36.5 °C (97.7 °F) (Temporal)   Resp 19   Ht 5' 11" (1.803 m)   Wt 76.2 kg (168 lb)   SpO2 99%   BMI 23.43 kg/m²       Pain/Stefani Score: Pain Assessment Performed: Yes (7/13/2018  5:29 PM)  Presence of Pain: denies (7/13/2018  5:29 PM)  Pain Rating Prior to Med Admin: 0 (7/13/2018  5:02 PM)  Pain Rating Post Med Admin: 0 (7/13/2018  5:29 PM)  Stefani Score: 10 (7/13/2018  5:29 PM)      "

## 2018-07-13 NOTE — PLAN OF CARE
Pt vital signs wnl. Pt verbalizes no pain and no nausea.  Rt groin dressing intact with no signs of bleeding.  Left chest dressing intact with no signs of bleeding.  Left leg pulses remains palpable.

## 2018-07-13 NOTE — NURSING TRANSFER
Nursing Transfer Note      7/13/2018     Transfer To: [id number]    Transfer via bed    Transfer with cardiac monitoring    Transported by pct and rn    Medicines sent: ivf    Chart send with patient: Yes    Notified: spouse    Patient reassessed at: 7/13/18

## 2018-07-13 NOTE — BRIEF OP NOTE
[facility]  Brief Operative Note    SUMMARY     Surgery Date: 7/13/2018     Surgeon(s) and Role:     * KWASI Roldan III, MD - Primary    Assisting Surgeon: None    Pre-op Diagnosis:  Lower limb ischemia [I99.8]    Post-op Diagnosis:  Post-Op Diagnosis Codes:     * Lower limb ischemia [I99.8]    PROCEDURES:    1.  Open Thrombectomy L Axillary - L Fem bypass  2. Open thrombectomy, L femoral - R fem bypass  3.  Open patch angioplasty, LEFT femoral anastomosis with bovine patch  4. Balloon angioplasty, RIGHT femoral anastamosis  5.Left  Axillary artery angiogram, R LE angiogram    Anesthesia: General    Description of Procedure: successful hybrid revascularization of thrombosed R ax- fem and R to L fem fem bypasses    Description of the findings of the procedure: 14.4 min fluoro; 415 mGy; 46cc visipaque    Estimated Blood Loss: 500cc         Specimens:   Specimen (12h ago through future)    None

## 2018-07-13 NOTE — PLAN OF CARE
Problem: Patient Care Overview  Goal: Plan of Care Review  Outcome: Ongoing (interventions implemented as appropriate)  Pt A & O x 4, adequate urine output via commode, pt denies pain, vitals stable on room air.  Pt NSR on telemetry. Pt up ad pierce.

## 2018-07-14 PROBLEM — D64.9 ANEMIA: Status: ACTIVE | Noted: 2018-07-14

## 2018-07-14 LAB
ALBUMIN SERPL BCP-MCNC: 2.8 G/DL
ALP SERPL-CCNC: 70 U/L
ALT SERPL W/O P-5'-P-CCNC: 13 U/L
ANION GAP SERPL CALC-SCNC: 7 MMOL/L
AST SERPL-CCNC: 17 U/L
BASOPHILS # BLD AUTO: 0.04 K/UL
BASOPHILS # BLD AUTO: 0.04 K/UL
BASOPHILS NFR BLD: 0.4 %
BASOPHILS NFR BLD: 0.4 %
BILIRUB SERPL-MCNC: 0.5 MG/DL
BLD PROD TYP BPU: NORMAL
BLD PROD TYP BPU: NORMAL
BLOOD UNIT EXPIRATION DATE: NORMAL
BLOOD UNIT EXPIRATION DATE: NORMAL
BLOOD UNIT TYPE CODE: 5100
BLOOD UNIT TYPE CODE: 5100
BLOOD UNIT TYPE: NORMAL
BLOOD UNIT TYPE: NORMAL
BUN SERPL-MCNC: 14 MG/DL
CALCIUM SERPL-MCNC: 8 MG/DL
CHLORIDE SERPL-SCNC: 110 MMOL/L
CO2 SERPL-SCNC: 22 MMOL/L
CODING SYSTEM: NORMAL
CODING SYSTEM: NORMAL
CREAT SERPL-MCNC: 0.9 MG/DL
DIFFERENTIAL METHOD: ABNORMAL
DIFFERENTIAL METHOD: ABNORMAL
DISPENSE STATUS: NORMAL
DISPENSE STATUS: NORMAL
EOSINOPHIL # BLD AUTO: 0.1 K/UL
EOSINOPHIL # BLD AUTO: 0.1 K/UL
EOSINOPHIL NFR BLD: 0.9 %
EOSINOPHIL NFR BLD: 1.6 %
ERYTHROCYTE [DISTWIDTH] IN BLOOD BY AUTOMATED COUNT: 12.7 %
ERYTHROCYTE [DISTWIDTH] IN BLOOD BY AUTOMATED COUNT: 13 %
EST. GFR  (AFRICAN AMERICAN): >60 ML/MIN/1.73 M^2
EST. GFR  (NON AFRICAN AMERICAN): >60 ML/MIN/1.73 M^2
GLUCOSE SERPL-MCNC: 92 MG/DL
HCT VFR BLD AUTO: 23.8 %
HCT VFR BLD AUTO: 31.4 %
HGB BLD-MCNC: 10.2 G/DL
HGB BLD-MCNC: 7.7 G/DL
IMM GRANULOCYTES # BLD AUTO: 0.03 K/UL
IMM GRANULOCYTES # BLD AUTO: 0.03 K/UL
IMM GRANULOCYTES NFR BLD AUTO: 0.3 %
IMM GRANULOCYTES NFR BLD AUTO: 0.3 %
LYMPHOCYTES # BLD AUTO: 1.8 K/UL
LYMPHOCYTES # BLD AUTO: 2.2 K/UL
LYMPHOCYTES NFR BLD: 19.7 %
LYMPHOCYTES NFR BLD: 24 %
MAGNESIUM SERPL-MCNC: 1.8 MG/DL
MCH RBC QN AUTO: 30.1 PG
MCH RBC QN AUTO: 30.1 PG
MCHC RBC AUTO-ENTMCNC: 32.4 G/DL
MCHC RBC AUTO-ENTMCNC: 32.5 G/DL
MCV RBC AUTO: 93 FL
MCV RBC AUTO: 93 FL
MONOCYTES # BLD AUTO: 0.7 K/UL
MONOCYTES # BLD AUTO: 0.8 K/UL
MONOCYTES NFR BLD: 7.7 %
MONOCYTES NFR BLD: 8.4 %
NEUTROPHILS # BLD AUTO: 6.2 K/UL
NEUTROPHILS # BLD AUTO: 6.2 K/UL
NEUTROPHILS NFR BLD: 66.7 %
NEUTROPHILS NFR BLD: 69.6 %
NRBC BLD-RTO: 0 /100 WBC
NRBC BLD-RTO: 0 /100 WBC
PHOSPHATE SERPL-MCNC: 3.4 MG/DL
PLATELET # BLD AUTO: 230 K/UL
PLATELET # BLD AUTO: 232 K/UL
PMV BLD AUTO: 9.2 FL
PMV BLD AUTO: 9.2 FL
POTASSIUM SERPL-SCNC: 4.2 MMOL/L
PROT SERPL-MCNC: 4.6 G/DL
RBC # BLD AUTO: 2.56 M/UL
RBC # BLD AUTO: 3.39 M/UL
SODIUM SERPL-SCNC: 139 MMOL/L
TRANS ERYTHROCYTES VOL PATIENT: NORMAL ML
TRANS ERYTHROCYTES VOL PATIENT: NORMAL ML
WBC # BLD AUTO: 8.93 K/UL
WBC # BLD AUTO: 9.22 K/UL

## 2018-07-14 PROCEDURE — 25000003 PHARM REV CODE 250: Performed by: SURGERY

## 2018-07-14 PROCEDURE — 51798 US URINE CAPACITY MEASURE: CPT

## 2018-07-14 PROCEDURE — 25000003 PHARM REV CODE 250: Performed by: STUDENT IN AN ORGANIZED HEALTH CARE EDUCATION/TRAINING PROGRAM

## 2018-07-14 PROCEDURE — 20600001 HC STEP DOWN PRIVATE ROOM

## 2018-07-14 PROCEDURE — 36415 COLL VENOUS BLD VENIPUNCTURE: CPT

## 2018-07-14 PROCEDURE — 85025 COMPLETE CBC W/AUTO DIFF WBC: CPT

## 2018-07-14 PROCEDURE — 36430 TRANSFUSION BLD/BLD COMPNT: CPT

## 2018-07-14 PROCEDURE — P9021 RED BLOOD CELLS UNIT: HCPCS

## 2018-07-14 PROCEDURE — 84100 ASSAY OF PHOSPHORUS: CPT

## 2018-07-14 PROCEDURE — 83735 ASSAY OF MAGNESIUM: CPT

## 2018-07-14 PROCEDURE — 80053 COMPREHEN METABOLIC PANEL: CPT

## 2018-07-14 RX ORDER — HYDROCODONE BITARTRATE AND ACETAMINOPHEN 500; 5 MG/1; MG/1
TABLET ORAL
Status: DISCONTINUED | OUTPATIENT
Start: 2018-07-14 | End: 2018-07-15 | Stop reason: HOSPADM

## 2018-07-14 RX ADMIN — SODIUM CHLORIDE, SODIUM LACTATE, POTASSIUM CHLORIDE, AND CALCIUM CHLORIDE 1000 ML: .6; .31; .03; .02 INJECTION, SOLUTION INTRAVENOUS at 08:07

## 2018-07-14 RX ADMIN — SODIUM CHLORIDE, SODIUM LACTATE, POTASSIUM CHLORIDE, AND CALCIUM CHLORIDE: .6; .31; .03; .02 INJECTION, SOLUTION INTRAVENOUS at 12:07

## 2018-07-14 RX ADMIN — SODIUM CHLORIDE, SODIUM LACTATE, POTASSIUM CHLORIDE, AND CALCIUM CHLORIDE: .6; .31; .03; .02 INJECTION, SOLUTION INTRAVENOUS at 07:07

## 2018-07-14 RX ADMIN — ATORVASTATIN CALCIUM 40 MG: 20 TABLET, FILM COATED ORAL at 08:07

## 2018-07-14 RX ADMIN — ASPIRIN 81 MG: 81 TABLET, COATED ORAL at 07:07

## 2018-07-14 NOTE — PLAN OF CARE
Problem: Patient Care Overview  Goal: Plan of Care Review  Outcome: Ongoing (interventions implemented as appropriate)  Patient is alert, awake, and oriented. Complains of soreness. Prn pain medicine is available. VS stable. Free of injuries and falls. Martini catheter to gravity. Neurovascular and VS q 2hrs. Verbalizes  Understanding of plan of care. Nurse will continue to monitor.

## 2018-07-14 NOTE — PROGRESS NOTES
[facility]  Vascular Surgery  Progress Note    Patient Name: Alek Joyner  MRN: [id number]  Admission Date: 7/12/2018  Primary Care Provider: Jan Jorgensen MD    Subjective:     Interval History: no acute events overnight. Pain controlled.     Post-Op Info:  Procedure(s) (LRB):  THROMBECTOMY, GRAFT, OPEN (Bilateral)  ANGIOPLASTY, USING PATCH GRAFT (Left)  PTA, BYPASS GRAFT, USING BALLOON (Right)  ANGIOGRAM (Right)   1 Day Post-Op     Prescriptions Prior to Admission   Medication Sig Dispense Refill Last Dose    rivaroxaban (XARELTO) 20 mg Tab Take 1 tablet (20 mg total) by mouth once daily. 30 tablet 3 Past Week at Unknown time    aspirin (ECOTRIN) 81 MG EC tablet Take 81 mg by mouth every morning.   More than a month at Unknown time    atorvastatin (LIPITOR) 40 MG tablet Take 1 tablet (40 mg total) by mouth once daily. 90 tablet 3 Taking    hydrocodone-acetaminophen 5-325mg (NORCO) 5-325 mg per tablet Take 1 tablet by mouth every 4 (four) hours as needed. 40 tablet 0 More than a month at Unknown time    lisinopril-hydrochlorothiazide (PRINZIDE,ZESTORETIC) 10-12.5 mg per tablet Take 1 tablet by mouth every morning. 90 tablet 3 Taking    ondansetron (ZOFRAN-ODT) 8 MG TbDL Take 1 tablet (8 mg total) by mouth every 6 (six) hours as needed (nausea). 30 tablet 2 More than a month at Unknown time       Review of patient's allergies indicates:  No Known Allergies    Past Medical History:   Diagnosis Date    Hypertension      Past Surgical History:   Procedure Laterality Date    BACK SURGERY      cardiac bypass      CARDIAC SURGERY       Family History     None        Social History Main Topics    Smoking status: Current Some Day Smoker     Types: Cigarettes    Smokeless tobacco: Not on file    Alcohol use No    Drug use: No    Sexual activity: Not on file     Review of Systems   Constitutional: Positive for activity change. Negative for chills.   HENT: Negative for congestion  and dental problem.    Eyes: Negative for discharge.   Respiratory: Negative for apnea.    Cardiovascular: Negative for chest pain.   Gastrointestinal: Negative for abdominal distention.   Musculoskeletal: Positive for myalgias.   Skin: Negative for color change and wound.   Hematological: Negative for adenopathy. Bruises/bleeds easily.   Psychiatric/Behavioral: Negative for agitation and behavioral problems.     Objective:     Vital Signs (Most Recent):  Temp: 96.6 °F (35.9 °C) (07/13/18 0808)  Pulse: 73 (07/13/18 0807)  Resp: 17 (07/13/18 0807)  BP: (!) 117/59 (07/13/18 0807)  SpO2: 97 % (07/13/18 0807) Vital Signs (24h Range):  Temp:  [94.6 °F (34.8 °C)-97.8 °F (36.6 °C)] 96.6 °F (35.9 °C)  Pulse:  [60-93] 73  Resp:  [16-17] 17  SpO2:  [96 %-98 %] 97 %  BP: (101-118)/(50-63) 117/59     Weight: 77.2 kg (170 lb 3.1 oz)  Body mass index is 23.74 kg/m².    Physical Exam   Constitutional: He is oriented to person, place, and time. He appears well-developed and well-nourished.   Cardiovascular: Normal rate and regular rhythm.    Bilateral DP 2+  Warm feet.   Left flank incision without hematoma, dressings dry.   Left groin incision without hematoma, dressings dry.    Pulmonary/Chest: Effort normal. No respiratory distress.   Abdominal: Soft. He exhibits no distension. There is no tenderness.   Musculoskeletal:   Motor sensory intact bilateral feet  No wounds on either foot   Neurological: He is alert and oriented to person, place, and time.   Skin: Skin is warm and dry.       Significant Labs:    Recent Labs  Lab 07/13/18  0647   WBC 8.23  8.14   RBC 3.39*  3.41*   HGB 10.4*  10.3*   HCT 31.7*  32.3*     312   MCV 94  95   MCH 30.7  30.2   MCHC 32.8  31.9*         Assessment/Plan:     * Arterial graft thrombosis, sequela    Patient is a 67 yo M with h/o long term tobacco use (now has quit) and severe PAOD (h/o ABF in 2011, R ax bifem 2017, thrombosed; L ax bifem in 1/2017) presenting with bilateral rest  pain and occlusion of L ax bifem at least since 6/6/18. Patient with > 4 weeks thrombosis of L ax bifemoral bypass graft presenting with bilateral rest pain     S/p open thrombectomy of Ax-fem-fem bypass graft with successful restoration of in-line flow. Now palpable pulse.     ABLA - hgb 7.7, no tachycardia. monitor  Maintain MAP >65 to prevent graft thrombosis.   On heparin gtt @ non-titrating 500 units/h. Will plan to transition this to Eliquis 10mg BID (will keep this dosing indefinitely)  Dc a-line, deepak eden, ambulate  regular diet.               Jagdish Mcdaniel MD  Vascular Surgery  [facility]

## 2018-07-14 NOTE — PLAN OF CARE
Problem: Patient Care Overview  Goal: Plan of Care Review  Plan of care reviewed with patient who verbalized understanding.  AAO.  Pt received 2 units of blood, tolerated well.  Post transfusion CBC completed.  NV checks Q2, VS Q2.  MAP > 65.  Call bell remains within reach.  Bed in lowest position.  Frequent rounds made for pt safety.  Patient remains free of any new or additional falls/injury at this time. VSS on RA, will continue to monitor.

## 2018-07-14 NOTE — ASSESSMENT & PLAN NOTE
Patient is a 65 yo M with h/o long term tobacco use (now has quit) and severe PAOD (h/o ABF in 2011, R ax bifem 2017, thrombosed; L ax bifem in 1/2017) presenting with bilateral rest pain and occlusion of L ax bifem at least since 6/6/18. Patient with > 4 weeks thrombosis of L ax bifemoral bypass graft presenting with bilateral rest pain     S/p open thrombectomy of Ax-fem-fem bypass graft with successful restoration of in-line flow. Now palpable pulse.     ABLA - hgb 7.7, no tachycardia. monitor  Maintain MAP >65 to prevent graft thrombosis.   On heparin gtt @ non-titrating 500 units/h. Will plan to transition this to Eliquis 10mg BID (will keep this dosing indefinitely)  Dc a-line, dc eden, ambulate  regular diet.

## 2018-07-15 VITALS
TEMPERATURE: 98 F | HEART RATE: 86 BPM | OXYGEN SATURATION: 100 % | RESPIRATION RATE: 20 BRPM | SYSTOLIC BLOOD PRESSURE: 114 MMHG | HEIGHT: 71 IN | DIASTOLIC BLOOD PRESSURE: 56 MMHG | BODY MASS INDEX: 23.52 KG/M2 | WEIGHT: 168 LBS

## 2018-07-15 LAB
ALBUMIN SERPL BCP-MCNC: 2.8 G/DL
ALP SERPL-CCNC: 73 U/L
ALT SERPL W/O P-5'-P-CCNC: 13 U/L
ANION GAP SERPL CALC-SCNC: 7 MMOL/L
AST SERPL-CCNC: 20 U/L
BASOPHILS # BLD AUTO: 0.03 K/UL
BASOPHILS NFR BLD: 0.4 %
BILIRUB SERPL-MCNC: 0.4 MG/DL
BUN SERPL-MCNC: 11 MG/DL
CALCIUM SERPL-MCNC: 8.5 MG/DL
CHLORIDE SERPL-SCNC: 111 MMOL/L
CO2 SERPL-SCNC: 24 MMOL/L
CREAT SERPL-MCNC: 0.9 MG/DL
DIFFERENTIAL METHOD: ABNORMAL
EOSINOPHIL # BLD AUTO: 0.2 K/UL
EOSINOPHIL NFR BLD: 2.6 %
ERYTHROCYTE [DISTWIDTH] IN BLOOD BY AUTOMATED COUNT: 12.9 %
EST. GFR  (AFRICAN AMERICAN): >60 ML/MIN/1.73 M^2
EST. GFR  (NON AFRICAN AMERICAN): >60 ML/MIN/1.73 M^2
GLUCOSE SERPL-MCNC: 95 MG/DL
HCT VFR BLD AUTO: 29.4 %
HGB BLD-MCNC: 9.8 G/DL
IMM GRANULOCYTES # BLD AUTO: 0.02 K/UL
IMM GRANULOCYTES NFR BLD AUTO: 0.3 %
LYMPHOCYTES # BLD AUTO: 1.7 K/UL
LYMPHOCYTES NFR BLD: 22 %
MAGNESIUM SERPL-MCNC: 1.8 MG/DL
MCH RBC QN AUTO: 30.5 PG
MCHC RBC AUTO-ENTMCNC: 33.3 G/DL
MCV RBC AUTO: 92 FL
MONOCYTES # BLD AUTO: 0.8 K/UL
MONOCYTES NFR BLD: 10.8 %
NEUTROPHILS # BLD AUTO: 4.8 K/UL
NEUTROPHILS NFR BLD: 63.9 %
NRBC BLD-RTO: 0 /100 WBC
PHOSPHATE SERPL-MCNC: 2.7 MG/DL
PLATELET # BLD AUTO: 199 K/UL
PMV BLD AUTO: 9.5 FL
POTASSIUM SERPL-SCNC: 4.3 MMOL/L
PROT SERPL-MCNC: 4.9 G/DL
RBC # BLD AUTO: 3.21 M/UL
SODIUM SERPL-SCNC: 142 MMOL/L
WBC # BLD AUTO: 7.56 K/UL

## 2018-07-15 PROCEDURE — 84100 ASSAY OF PHOSPHORUS: CPT

## 2018-07-15 PROCEDURE — 25000003 PHARM REV CODE 250: Performed by: STUDENT IN AN ORGANIZED HEALTH CARE EDUCATION/TRAINING PROGRAM

## 2018-07-15 PROCEDURE — 80053 COMPREHEN METABOLIC PANEL: CPT

## 2018-07-15 PROCEDURE — 83735 ASSAY OF MAGNESIUM: CPT

## 2018-07-15 PROCEDURE — 36415 COLL VENOUS BLD VENIPUNCTURE: CPT

## 2018-07-15 PROCEDURE — 85025 COMPLETE CBC W/AUTO DIFF WBC: CPT

## 2018-07-15 PROCEDURE — 25000003 PHARM REV CODE 250: Performed by: SURGERY

## 2018-07-15 RX ORDER — LANOLIN ALCOHOL/MO/W.PET/CERES
800 CREAM (GRAM) TOPICAL ONCE
Status: COMPLETED | OUTPATIENT
Start: 2018-07-15 | End: 2018-07-15

## 2018-07-15 RX ORDER — SODIUM,POTASSIUM PHOSPHATES 280-250MG
1 POWDER IN PACKET (EA) ORAL ONCE
Status: COMPLETED | OUTPATIENT
Start: 2018-07-15 | End: 2018-07-15

## 2018-07-15 RX ORDER — OXYCODONE AND ACETAMINOPHEN 5; 325 MG/1; MG/1
1 TABLET ORAL EVERY 4 HOURS PRN
Qty: 20 TABLET | Refills: 0 | Status: SHIPPED | OUTPATIENT
Start: 2018-07-15

## 2018-07-15 RX ORDER — OXYCODONE AND ACETAMINOPHEN 5; 325 MG/1; MG/1
1 TABLET ORAL EVERY 4 HOURS PRN
Qty: 20 TABLET | Refills: 0 | Status: SHIPPED | OUTPATIENT
Start: 2018-07-15 | End: 2018-07-15

## 2018-07-15 RX ADMIN — Medication 800 MG: at 06:07

## 2018-07-15 RX ADMIN — ASPIRIN 81 MG: 81 TABLET, COATED ORAL at 06:07

## 2018-07-15 RX ADMIN — POTASSIUM & SODIUM PHOSPHATES POWDER PACK 280-160-250 MG 1 PACKET: 280-160-250 PACK at 06:07

## 2018-07-15 RX ADMIN — APIXABAN 10 MG: 5 TABLET, FILM COATED ORAL at 09:07

## 2018-07-15 RX ADMIN — ATORVASTATIN CALCIUM 40 MG: 20 TABLET, FILM COATED ORAL at 09:07

## 2018-07-15 NOTE — PROGRESS NOTES
Discharge instructions reviewed with pt.  Prescriptions reviewed and given to pt.  Pt verbalized understanding.  All questions answered.  PIV x2 d'c'd with cath tip intact and discarded.  Pt currently awaiting transport.

## 2018-07-15 NOTE — DISCHARGE SUMMARY
[facility]  VASCULAR Surgery  Discharge Summary      Patient Name: Alek Joyner  MRN: [id number]  Admission Date: 7/12/2018  Hospital Length of Stay: 3 days  Discharge Date and Time:  07/15/2018 10:36 AM  Attending Physician: KWASI Roldan III, MD   Discharging Provider: Jessica Ann MD  Primary Care Provider: Jan Jorgensen MD    HPI:   Patient is a 67 yo M with h/o long term tobacco use (now has quit) and severe PAOD (h/o ABF in 2011, R ax bifem 2017, thrombosed; L ax bifem in 1/2017) presenting with bilateral rest pain.  Patient has reports he first noticed rest pain at the end of May was seen in outside ED on 6/6/18 with CTA done demonstrating thrombosed L ax bifem bypass. Patient reports that he was referred to local surgeon and couldn't get an appointment. Finally reached out to Dr. Roldan and was directly admitted for planned Or tomorrow.    Denies MI/stroke.  Has been compliant with his xarelto.  Reports that he quit smoking.    Procedure(s) (LRB):  THROMBECTOMY, GRAFT, OPEN (Bilateral)  ANGIOPLASTY, USING PATCH GRAFT (Left)  PTA, BYPASS GRAFT, USING BALLOON (Right)  ANGIOGRAM (Right)      Indwelling Lines/Drains at time of discharge:   Lines/Drains/Airways          No matching active lines, drains, or airways        Hospital Course: Mr. Joyner is a 68 year old male with severe PAD (h/o failed ABF 2011 and L ax fem fem bypass 2017, and most recently underwent a L ax fem fem 2017, thrombosed L ax bifem 2017) while on warfarin and on xarelto, previous tobacco abuse who presented with bilateral rest pain with known thrombosed L ax fem fem bypass since 6/2018. He was direct admitted on 7/13 for an open thrombectomy L ax fem bypass, and L-R fem fem bypass, open patch angioplasty with right fem anastomosis with L ax angio and R LE angio. Post-procedure, his underwent a transfusion of 2 U PRBC to which he responded to appropriately. Due to his history of multiple thromboses  while on xarelto and warfarin, he was transitioned to eliquis 10 BID with follow-up with Dr. Roldan in 2 weeks with accompanying ABIs with BLE arterial duplex of his bifem bypass.     He is agreeable to following up with his PCP in 1 week and also with the patient assistance program in his home town [place] who originally set him up with the program for a 1 year trial of eliquis. Due to having failed anticoagulation while on warfarin and xarelto, he was converted to eliquis with pharmacy and CM being able to provide him with an 18 day supply of eliquis, we will also have the patient assistance program at [facility] see how further medication assistance could be obtained for lifelong anticoagulation.     Consults:     Significant Diagnostic Studies: Labs:   BMP:   Recent Labs  Lab 07/13/18  1649 07/14/18  0355 07/15/18  0353    92 95    139 142   K 4.1 4.2 4.3    110 111*   CO2 19* 22* 24   BUN 14 14 11   CREATININE 0.8 0.9 0.9   CALCIUM 7.7* 8.0* 8.5*   MG  --  1.8 1.8    and CBC   Recent Labs  Lab 07/14/18  0355 07/14/18  1647 07/15/18  0353   WBC 9.22 8.93 7.56   HGB 7.7* 10.2* 9.8*   HCT 23.8* 31.4* 29.4*    230 199       Pending Diagnostic Studies:     None        Final Active Diagnoses:    Diagnosis Date Noted POA    PRINCIPAL PROBLEM:  Arterial graft thrombosis, sequela [T82.868S] 07/12/2018 Not Applicable    Anemia [D64.9] 07/14/2018 Yes    Peripheral arterial disease [I73.9] 07/12/2018 Yes    Atherosclerotic peripheral vascular disease with rest pain [I70.229] 12/20/2016 Yes      Problems Resolved During this Admission:    Diagnosis Date Noted Date Resolved POA      Discharged Condition: stable    Disposition: home     Follow Up:  Follow-up Information     KWASI Roldan Iii, MD In 2 weeks.    Specialty:  Vascular Surgery  Contact information:  [address] [facility] [id number]  [phone]                 Patient Instructions:     VAS US Ankle Brachial Indices  Resting   Standing Status: Future  Standing Exp. Date: 07/15/19     VAS US Arterial Legs Bilateral   Standing Status: Future  Standing Exp. Date: 07/15/19   Scheduling Instructions: Please evaluate left bifemoral bypass     Diet Cardiac     Other restrictions (specify):   Order Comments: No driving while still taking pain medications daily.   Take a stool softener while taking pain medications daily.   No lifting more than 10 lbs for 6 weeks., please refrain from sitting for long period and placing weight and compressing left groin site   Would recommend that dry gauze be placed within the crease to keep area involving the incision site dry   Ok to shower, but would refrain from soaking incision in water   May continue with regular diet as tolerated prior to admission     Notify your health care provider if you experience any of the following:  persistent nausea and vomiting or diarrhea     Notify your health care provider if you experience any of the following:  temperature >100.4     Notify your health care provider if you experience any of the following:  severe uncontrolled pain     Notify your health care provider if you experience any of the following:  redness, tenderness, or signs of infection (pain, swelling, redness, odor or green/yellow discharge around incision site)     Notify your health care provider if you experience any of the following:  difficulty breathing or increased cough       Medications:  Reconciled Home Medications:      Medication List      START taking these medications    ELIQUIS 5 mg Tab  Generic drug:  apixaban  Take 2 tablets (10 mg total) by mouth 2 (two) times daily.     oxyCODONE-acetaminophen 5-325 mg per tablet  Commonly known as:  PERCOCET  Take 1 tablet by mouth every 4 (four) hours as needed.        CONTINUE taking these medications    aspirin 81 MG EC tablet  Commonly known as:  ECOTRIN  Take 81 mg by mouth every morning.     atorvastatin 40 MG tablet  Commonly known as:   LIPITOR  Take 1 tablet (40 mg total) by mouth once daily.     lisinopril-hydrochlorothiazide 10-12.5 mg per tablet  Commonly known as:  PRINZIDE,ZESTORETIC  Take 1 tablet by mouth every morning.     ondansetron 8 MG Tbdl  Commonly known as:  ZOFRAN-ODT  Take 1 tablet (8 mg total) by mouth every 6 (six) hours as needed (nausea).        STOP taking these medications    HYDROcodone-acetaminophen 5-325 mg per tablet  Commonly known as:  NORCO     rivaroxaban 20 mg Tab  Commonly known as:  XARELTO          Time spent on the discharge of patient: 20 minutes    Jessica Ann MD  General Surgery  [facility]

## 2018-07-15 NOTE — PLAN OF CARE
Problem: Patient Care Overview  Goal: Plan of Care Review  Outcome: Ongoing (interventions implemented as appropriate)  Plan of care reviewed with patient. Patient verbalized understanding. Patient is oriented x4. Patient remained on Heparin drip throughout shift. Patient voided per urinal. Patient vital signs was taken q2. Patient denied any pain. Patient NV were done q2. Patient remained free of any falls or acute events. VSS, Will continue to monitor.

## 2018-07-15 NOTE — HPI
Patient is a 65 yo M with h/o long term tobacco use (now has quit) and severe PAOD (h/o ABF in 2011, R ax bifem 2017, thrombosed; L ax bifem in 1/2017) presenting with bilateral rest pain.  Patient has reports he first noticed rest pain at the end of May was seen in outside ED on 6/6/18 with CTA done demonstrating thrombosed L ax bifem bypass. Patient reports that he was referred to local surgeon and couldn't get an appointment. Finally reached out to Dr. Roldan and was directly admitted for planned Or tomorrow.    Denies MI/stroke.  Has been compliant with his xarelto.  Reports that he quit smoking.

## 2018-07-15 NOTE — HOSPITAL COURSE
Mr. Joyner is a 68 year old male with severe PAD (h/o failed ABF 2011 and L ax fem fem bypass 2017, and most recently underwent a L ax fem fem 2017, thrombosed L ax bifem 2017) while on warfarin and on xarelto, previous tobacco abuse who presented with bilateral rest pain with known thrombosed L ax fem fem bypass since 6/2018. He was direct admitted on 7/13 for an open thrombectomy L ax fem bypass, and L-R fem fem bypass, open patch angioplasty with right fem anastomosis with L ax angio and R LE angio. Post-procedure, his underwent a transfusion of 2 U PRBC to which he responded to appropriately. Due to his history of multiple thromboses while on xarelto and warfarin, he was transitioned to eliquis 10 BID with follow-up with Dr. Roldan in 2 weeks with accompanying ABIs with BLE arterial duplex of his bifem bypass.     He is agreeable to following up with his PCP in 1 week and also with the patient assistance program in his home town [place] who originally set him up with the program for a 1 year trial of eliquis. Due to having failed anticoagulation while on warfarin and xarelto, he was converted to eliquis with pharmacy and CM being able to provide him with an 18 day supply of eliquis, we will also have the patient assistance program at [facility] see how further medication assistance could be obtained for lifelong anticoagulation.

## 2018-07-15 NOTE — PROGRESS NOTES
[facility]  Vascular Surgery  Progress Note    Patient Name: Alek Joyner  MRN: [id number]  Admission Date: 7/12/2018  Primary Care Provider: Jan Jorgnesen MD    Subjective:     Interval History: 2 PRBC yesterday for periop ABLA. Appropriate response. No acute events overnight.     Post-Op Info:  Procedure(s) (LRB):  THROMBECTOMY, GRAFT, OPEN (Bilateral)  ANGIOPLASTY, USING PATCH GRAFT (Left)  PTA, BYPASS GRAFT, USING BALLOON (Right)  ANGIOGRAM (Right)   2 Days Post-Op     Prescriptions Prior to Admission   Medication Sig Dispense Refill Last Dose    rivaroxaban (XARELTO) 20 mg Tab Take 1 tablet (20 mg total) by mouth once daily. 30 tablet 3 Past Week at Unknown time    aspirin (ECOTRIN) 81 MG EC tablet Take 81 mg by mouth every morning.   More than a month at Unknown time    atorvastatin (LIPITOR) 40 MG tablet Take 1 tablet (40 mg total) by mouth once daily. 90 tablet 3 Taking    hydrocodone-acetaminophen 5-325mg (NORCO) 5-325 mg per tablet Take 1 tablet by mouth every 4 (four) hours as needed. 40 tablet 0 More than a month at Unknown time    lisinopril-hydrochlorothiazide (PRINZIDE,ZESTORETIC) 10-12.5 mg per tablet Take 1 tablet by mouth every morning. 90 tablet 3 Taking    ondansetron (ZOFRAN-ODT) 8 MG TbDL Take 1 tablet (8 mg total) by mouth every 6 (six) hours as needed (nausea). 30 tablet 2 More than a month at Unknown time       Review of patient's allergies indicates:  No Known Allergies    Past Medical History:   Diagnosis Date    Hypertension      Past Surgical History:   Procedure Laterality Date    BACK SURGERY      cardiac bypass      cardiac bypass is error, had Aortobifem bypass    CARDIAC SURGERY      OTHER SURGICAL HISTORY      right and left axillary fem fem bypass     Family History     None        Social History Main Topics    Smoking status: Current Some Day Smoker     Types: Cigarettes    Smokeless tobacco: Not on file    Alcohol use No    Drug use:  No    Sexual activity: Not on file     Review of Systems   Constitutional: Positive for activity change. Negative for chills.   HENT: Negative for congestion and dental problem.    Eyes: Negative for discharge.   Respiratory: Negative for apnea.    Cardiovascular: Negative for chest pain.   Gastrointestinal: Negative for abdominal distention.   Musculoskeletal: Positive for myalgias.   Skin: Negative for color change and wound.   Hematological: Negative for adenopathy. Bruises/bleeds easily.   Psychiatric/Behavioral: Negative for agitation and behavioral problems.     Objective:     Vital Signs (Most Recent):  Temp: 97.5 °F (36.4 °C) (07/15/18 0800)  Pulse: 81 (07/15/18 0800)  Resp: 20 (07/15/18 0800)  BP: (!) 127/91 (07/15/18 0800)  SpO2: 99 % (07/15/18 0800) Vital Signs (24h Range):  Temp:  [97.5 °F (36.4 °C)-100.1 °F (37.8 °C)] 97.5 °F (36.4 °C)  Pulse:  [] 81  Resp:  [16-23] 20  SpO2:  [97 %-100 %] 99 %  BP: ()/(52-91) 127/91  Arterial Line BP: (130)/(57) 130/57     Weight: 76.2 kg (168 lb)  Body mass index is 23.43 kg/m².    Physical Exam   Constitutional: He is oriented to person, place, and time. He appears well-developed and well-nourished.   Cardiovascular: Normal rate and regular rhythm.    Bilateral DP 2+  Warm feet.   Left flank incision without hematoma, c/d/i   Left groin incision without hematoma, c/d/i   Pulmonary/Chest: Effort normal. No respiratory distress.   Abdominal: Soft. He exhibits no distension. There is no tenderness.   Musculoskeletal:   Motor sensory intact bilateral feet  No wounds on either foot   Neurological: He is alert and oriented to person, place, and time.   Skin: Skin is warm and dry.       Significant Labs:    Recent Labs  Lab 07/15/18  0353   WBC 7.56   RBC 3.21*   HGB 9.8*   HCT 29.4*      MCV 92   MCH 30.5   MCHC 33.3         Assessment/Plan:     * Arterial graft thrombosis, sequela    Patient is a 67 yo M with h/o long term tobacco use (now has quit)  and severe PAOD (h/o ABF in 2011, R ax bifem 2017, thrombosed; L ax bifem in 1/2017) presenting with bilateral rest pain and occlusion of L ax bifem at least since 6/6/18. Patient with > 4 weeks thrombosis of L ax bifemoral bypass graft presenting with bilateral rest pain     S/p open thrombectomy of Ax-fem-fem bypass graft with successful restoration of in-line flow. Now palpable pulse.     ABLA - s/p 2 PRBC, hgb 9.8 (7.7), no tachycardia  Now on Eliquis 10mg BID (will keep this dosing indefinitely) - Patient without medicare D, will check with pharmacy and assistance program for affordability.   regular diet.               Jagdish Mcdaniel MD  Vascular Surgery  [facility]

## 2018-07-15 NOTE — SUBJECTIVE & OBJECTIVE
Prescriptions Prior to Admission   Medication Sig Dispense Refill Last Dose    rivaroxaban (XARELTO) 20 mg Tab Take 1 tablet (20 mg total) by mouth once daily. 30 tablet 3 Past Week at Unknown time    aspirin (ECOTRIN) 81 MG EC tablet Take 81 mg by mouth every morning.   More than a month at Unknown time    atorvastatin (LIPITOR) 40 MG tablet Take 1 tablet (40 mg total) by mouth once daily. 90 tablet 3 Taking    hydrocodone-acetaminophen 5-325mg (NORCO) 5-325 mg per tablet Take 1 tablet by mouth every 4 (four) hours as needed. 40 tablet 0 More than a month at Unknown time    lisinopril-hydrochlorothiazide (PRINZIDE,ZESTORETIC) 10-12.5 mg per tablet Take 1 tablet by mouth every morning. 90 tablet 3 Taking    ondansetron (ZOFRAN-ODT) 8 MG TbDL Take 1 tablet (8 mg total) by mouth every 6 (six) hours as needed (nausea). 30 tablet 2 More than a month at Unknown time       Review of patient's allergies indicates:  No Known Allergies    Past Medical History:   Diagnosis Date    Hypertension      Past Surgical History:   Procedure Laterality Date    BACK SURGERY      cardiac bypass      cardiac bypass is error, had Aortobifem bypass    CARDIAC SURGERY      OTHER SURGICAL HISTORY      right and left axillary fem fem bypass     Family History     None        Social History Main Topics    Smoking status: Current Some Day Smoker     Types: Cigarettes    Smokeless tobacco: Not on file    Alcohol use No    Drug use: No    Sexual activity: Not on file     Review of Systems   Constitutional: Positive for activity change. Negative for chills.   HENT: Negative for congestion and dental problem.    Eyes: Negative for discharge.   Respiratory: Negative for apnea.    Cardiovascular: Negative for chest pain.   Gastrointestinal: Negative for abdominal distention.   Musculoskeletal: Positive for myalgias.   Skin: Negative for color change and wound.   Hematological: Negative for adenopathy. Bruises/bleeds easily.    Psychiatric/Behavioral: Negative for agitation and behavioral problems.     Objective:     Vital Signs (Most Recent):  Temp: 97.5 °F (36.4 °C) (07/15/18 0800)  Pulse: 81 (07/15/18 0800)  Resp: 20 (07/15/18 0800)  BP: (!) 127/91 (07/15/18 0800)  SpO2: 99 % (07/15/18 0800) Vital Signs (24h Range):  Temp:  [97.5 °F (36.4 °C)-100.1 °F (37.8 °C)] 97.5 °F (36.4 °C)  Pulse:  [] 81  Resp:  [16-23] 20  SpO2:  [97 %-100 %] 99 %  BP: ()/(52-91) 127/91  Arterial Line BP: (130)/(57) 130/57     Weight: 76.2 kg (168 lb)  Body mass index is 23.43 kg/m².    Physical Exam   Constitutional: He is oriented to person, place, and time. He appears well-developed and well-nourished.   Cardiovascular: Normal rate and regular rhythm.    Bilateral DP 2+  Warm feet.   Left flank incision without hematoma, c/d/i   Left groin incision without hematoma, c/d/i   Pulmonary/Chest: Effort normal. No respiratory distress.   Abdominal: Soft. He exhibits no distension. There is no tenderness.   Musculoskeletal:   Motor sensory intact bilateral feet  No wounds on either foot   Neurological: He is alert and oriented to person, place, and time.   Skin: Skin is warm and dry.       Significant Labs:    Recent Labs  Lab 07/15/18  0353   WBC 7.56   RBC 3.21*   HGB 9.8*   HCT 29.4*      MCV 92   MCH 30.5   MCHC 33.3

## 2018-07-15 NOTE — ASSESSMENT & PLAN NOTE
Patient is a 65 yo M with h/o long term tobacco use (now has quit) and severe PAOD (h/o ABF in 2011, R ax bifem 2017, thrombosed; L ax bifem in 1/2017) presenting with bilateral rest pain and occlusion of L ax bifem at least since 6/6/18. Patient with > 4 weeks thrombosis of L ax bifemoral bypass graft presenting with bilateral rest pain     S/p open thrombectomy of Ax-fem-fem bypass graft with successful restoration of in-line flow. Now palpable pulse.     ABLA - s/p 2 PRBC, hgb 9.8 (7.7), no tachycardia  Now on Eliquis 10mg BID (will keep this dosing indefinitely) - Patient without medicare D, will check with pharmacy and assistance program for affordability.   regular diet.

## 2018-07-18 ENCOUNTER — TELEPHONE (OUTPATIENT)
Dept: VASCULAR SURGERY | Facility: CLINIC | Age: 68
End: 2018-07-18

## 2018-07-18 NOTE — TELEPHONE ENCOUNTER
Contacted patient with FU appt for Dr. Roldan. Verified appt. Appt letter placed in mail. ----- Message from Chitra Betancourt sent at 7/18/2018  8:27 AM CDT -----  Contact: daughter   Needs Advice    Reason for call:    Would like to schedule follow up appt.   Communication Preference: [phone]   Additional Information:

## 2018-08-07 ENCOUNTER — OFFICE VISIT (OUTPATIENT)
Dept: VASCULAR SURGERY | Facility: CLINIC | Age: 68
End: 2018-08-07
Payer: MEDICARE

## 2018-08-07 ENCOUNTER — HOSPITAL ENCOUNTER (OUTPATIENT)
Dept: VASCULAR SURGERY | Facility: CLINIC | Age: 68
Discharge: HOME | End: 2018-08-07
Attending: STUDENT IN AN ORGANIZED HEALTH CARE EDUCATION/TRAINING PROGRAM
Payer: MEDICARE

## 2018-08-07 VITALS
TEMPERATURE: 99 F | DIASTOLIC BLOOD PRESSURE: 66 MMHG | HEIGHT: 71 IN | HEART RATE: 69 BPM | WEIGHT: 171.94 LBS | SYSTOLIC BLOOD PRESSURE: 122 MMHG | BODY MASS INDEX: 24.07 KG/M2

## 2018-08-07 DIAGNOSIS — I73.9 PVD (PERIPHERAL VASCULAR DISEASE): ICD-10-CM

## 2018-08-07 DIAGNOSIS — T82.868S: ICD-10-CM

## 2018-08-07 DIAGNOSIS — I73.9 PVD (PERIPHERAL VASCULAR DISEASE): Primary | ICD-10-CM

## 2018-08-07 PROCEDURE — 99999 PR PBB SHADOW E&M-EST. PATIENT-LVL III: CPT | Mod: PBBFAC,,, | Performed by: SURGERY

## 2018-08-07 PROCEDURE — 93925 LOWER EXTREMITY STUDY: CPT | Mod: PBBFAC | Performed by: SURGERY

## 2018-08-07 PROCEDURE — 93923 UPR/LXTR ART STDY 3+ LVLS: CPT | Mod: 26,S$PBB,, | Performed by: SURGERY

## 2018-08-07 PROCEDURE — 93923 UPR/LXTR ART STDY 3+ LVLS: CPT | Mod: PBBFAC | Performed by: SURGERY

## 2018-08-07 PROCEDURE — 99024 POSTOP FOLLOW-UP VISIT: CPT | Mod: POP,,, | Performed by: SURGERY

## 2018-08-07 PROCEDURE — 93925 LOWER EXTREMITY STUDY: CPT | Mod: 26,S$PBB,, | Performed by: SURGERY

## 2018-08-07 PROCEDURE — 99213 OFFICE O/P EST LOW 20 MIN: CPT | Mod: PBBFAC | Performed by: SURGERY

## 2018-08-07 NOTE — PROGRESS NOTES
See my prior note; review of systems, family history and social history are   unchanged.    INITIAL REFERRING PHYSICIAN:  Dr. Jan Pate.    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   Thrombectomy, L ax-fem-fem with open patch, L femoral and PTa, R femoral 7/13/18  1.  Left ax-fem-fem bypass on 01/04/2017.  2.  Right ax-fem-fem bypass on 10/19/2016 (Dr. Pate).  3.  Aortobifemoral bypass in 2011 (? Dr. Pate).    His very short distance claudication completely resolved after the recent thrombectomy, but re-started ~3 days ago.  Hase been compliant with the Eliquis    MEDICATIONS: Eliquis and a statin.    PHYSICAL EXAMINATION:  VITAL SIGNS:  See nursing note.  EXTREMITIES:  His left infraclavicular incision and groin incisions are all well   healed.  Pedal pulses are not palpable.  EXTREMITIES:  On the left, there is a reasonably good PT signal and on the right   PT signal is present, but somewhat weaker and also with a present but fairly   weak DP signal.  Feet are pink and well perfused without lesions.    IMAGING:  ABIs are 0.49 (r) and 0.52 (L)    Duplex of the bypass confirms thrombosis    ASSESSMENT:  1.  Recurrent thrombosis, left ax-fem fem-fem bypass, ~3 wks after thrombectomy  2.  Of note, the distal limbs were anastomosed end-to-side to the very proximal   SFAs on both sides because of severe scarring from previous surgeries.  3.  Continued smoking.    I do not believe that further operative intervention will help this patient.  Discussed in detail with him    REC:   Cont eliquis for 3-6 months, then may D/C and change to ASA 81 mg daily  Cont pletal  Exercise program    PRN FU    CC: Jan Pate M.D.

## 2018-11-14 ENCOUNTER — TELEPHONE (OUTPATIENT)
Dept: VASCULAR SURGERY | Facility: CLINIC | Age: 68
End: 2018-11-14

## 2018-11-16 ENCOUNTER — TELEPHONE (OUTPATIENT)
Dept: VASCULAR SURGERY | Facility: CLINIC | Age: 68
End: 2018-11-16

## 2018-11-16 NOTE — TELEPHONE ENCOUNTER
"Contacted patient in response to Dr. Roldan's instructions regarding antiplatelet/anticoagulation therapy due to not being able to reach Michell Mcfarlane NP or leave message for her due to voice mail being full. Instructed patient ok to stop Eliquis now. Continue 81 mg aspirin daily and 100 mg of Pletal twice a day. Patient states he does not have a medication called Pletal. Notified patient generic name of medication is cilostazol. Pt states he has this but takes it once a day. Asked patient to read directions on bottle. Pt reads "take 1 tablet twice daily." Instructed patient to take medication twice daily as prescribed. Patient verbalized understanding.  "

## (undated) DEVICE — GOWN SMART IMP BREATHABLE XXLG

## (undated) DEVICE — DRESSING TRANS 2X2 TEGADERM

## (undated) DEVICE — INFLATOR ENCORE

## (undated) DEVICE — ELECTRODE REM PLYHSV RETURN 9

## (undated) DEVICE — CATH PRUITT IRRIG 9F OCCLUS

## (undated) DEVICE — CATH EMBOLECTOMY 5F REGULAR

## (undated) DEVICE — DRESSING TEGADERM IV 3.5 X 4.5

## (undated) DEVICE — SEE MEDLINE ITEM 153688

## (undated) DEVICE — DRESSING TELFA STRL 4X3 LF

## (undated) DEVICE — BLADE SURG CARBON STEEL SZ11

## (undated) DEVICE — DRESSING ABSRBNT ISLAND 3.6X8

## (undated) DEVICE — ADHESIVE DERMABOND ADVANCED

## (undated) DEVICE — GUIDEWIRE STF .035X180CM STR

## (undated) DEVICE — STOCKINET 4INX48

## (undated) DEVICE — LOOP VESSEL BLUE MAXI

## (undated) DEVICE — DRAPE PLASTIC U 60X72

## (undated) DEVICE — TRAY MINOR GEN SURG

## (undated) DEVICE — SUT 4-0 12-18IN SILK BLACK

## (undated) DEVICE — GUIDEWIRE ANG STF .035INX18CM

## (undated) DEVICE — SUT 2-0 12-18IN SILK

## (undated) DEVICE — SEE MEDLINE ITEM 152622

## (undated) DEVICE — SUT MCRYL PLUS 4-0 PS2 27IN

## (undated) DEVICE — CLIP MED TICALL

## (undated) DEVICE — SUT 2-0 VICRYL / CT-1

## (undated) DEVICE — CATH EMBO FOGARTY 5.5FRX80CM

## (undated) DEVICE — SEE MEDLINE ITEM 157173

## (undated) DEVICE — SET DECANTER MEDICHOICE

## (undated) DEVICE — COVER LIGHT HANDLE 80/CA

## (undated) DEVICE — TAPE UMBILICAL 1/8X36IN WHITE

## (undated) DEVICE — CATH ULTRAVERSE 035 6X20X75

## (undated) DEVICE — GOWN SURGICAL X-LARGE

## (undated) DEVICE — SUT 3-0 12-18IN SILK

## (undated) DEVICE — SUT LIGACLIP SMALL XTRA

## (undated) DEVICE — SYR ONLY LUER LOCK 20CC

## (undated) DEVICE — APPLICATOR CHLORAPREP ORN 26ML

## (undated) DEVICE — CATH EMBOLECTOMY 4F REGULAR

## (undated) DEVICE — VISE RADIFOCUS MULTI TORQUE

## (undated) DEVICE — CATH GUIDE 5F 65CM STR TAPER

## (undated) DEVICE — SUT SILK 2-0 SH 18IN BLACK

## (undated) DEVICE — Device

## (undated) DEVICE — COVERS PROBE NR-48 STERILE

## (undated) DEVICE — BOOT SUTURE AID